# Patient Record
Sex: FEMALE | Race: OTHER | HISPANIC OR LATINO | Employment: FULL TIME | URBAN - METROPOLITAN AREA
[De-identification: names, ages, dates, MRNs, and addresses within clinical notes are randomized per-mention and may not be internally consistent; named-entity substitution may affect disease eponyms.]

---

## 2019-08-08 ENCOUNTER — OFFICE VISIT (OUTPATIENT)
Dept: FAMILY MEDICINE CLINIC | Facility: CLINIC | Age: 39
End: 2019-08-08
Payer: COMMERCIAL

## 2019-08-08 VITALS
SYSTOLIC BLOOD PRESSURE: 120 MMHG | BODY MASS INDEX: 26.21 KG/M2 | TEMPERATURE: 96.1 F | OXYGEN SATURATION: 99 % | DIASTOLIC BLOOD PRESSURE: 80 MMHG | HEART RATE: 66 BPM | WEIGHT: 130 LBS | HEIGHT: 59 IN

## 2019-08-08 DIAGNOSIS — Z13.1 SCREENING FOR DIABETES MELLITUS: Primary | ICD-10-CM

## 2019-08-08 DIAGNOSIS — Z13.220 NEED FOR LIPID SCREENING: ICD-10-CM

## 2019-08-08 DIAGNOSIS — Z13.29 SCREENING FOR THYROID DISORDER: ICD-10-CM

## 2019-08-08 DIAGNOSIS — Z86.19 HISTORY OF SYPHILIS: ICD-10-CM

## 2019-08-08 DIAGNOSIS — Z76.89 ENCOUNTER TO ESTABLISH CARE WITH NEW DOCTOR: ICD-10-CM

## 2019-08-08 DIAGNOSIS — T78.3XXD ANGIOEDEMA, SUBSEQUENT ENCOUNTER: ICD-10-CM

## 2019-08-08 DIAGNOSIS — Z13.21 ENCOUNTER FOR VITAMIN DEFICIENCY SCREENING: ICD-10-CM

## 2019-08-08 DIAGNOSIS — I10 ESSENTIAL HYPERTENSION: ICD-10-CM

## 2019-08-08 PROBLEM — N63.0 BREAST MASS: Status: ACTIVE | Noted: 2019-06-27

## 2019-08-08 PROCEDURE — 3008F BODY MASS INDEX DOCD: CPT | Performed by: INTERNAL MEDICINE

## 2019-08-08 PROCEDURE — 99204 OFFICE O/P NEW MOD 45 MIN: CPT | Performed by: INTERNAL MEDICINE

## 2019-08-08 PROCEDURE — 3074F SYST BP LT 130 MM HG: CPT | Performed by: INTERNAL MEDICINE

## 2019-08-08 RX ORDER — DIPHENOXYLATE HYDROCHLORIDE AND ATROPINE SULFATE 2.5; .025 MG/1; MG/1
1 TABLET ORAL DAILY
COMMUNITY
Start: 2017-09-21

## 2019-08-08 NOTE — PROGRESS NOTES
Assessment/Plan:         Diagnoses and all orders for this visit:    Screening for diabetes mellitus  -     CBC and differential  -     Comprehensive metabolic panel    Encounter to establish care with new doctor   available records reviewed  Essential hypertension  -     Comprehensive metabolic panel  -     Lipid panel  Blood pressure is stable  I would not start any medications time  Angioedema, subsequent encounter  Improved clinically but with the patient that patient is showing me on her cell phone  Encouraged patient to start prednisone and Zyrtec  Lab studies ordered I will be seeing her back in week or so  I would not start another medication this time since she has had persistent edema of her labs and her blood pressure is excellent  Screening for thyroid disorder  -     TSH, 3rd generation with Free T4 reflex    Need for lipid screening  -     Urinalysis with microscopic    History of syphilis  -     HIV 1/2 AG-AB combo; Future  History of treatment per patient  Encounter for vitamin deficiency screening  -     Vitamin D 25 hydroxy    Other orders  -     multivitamin (THERAGRAN) TABS; Take 1 tablet by mouth daily  -     Cholecalciferol (VITAMIN D PO); Take by mouth        Subjective:      Patient ID: Dionne Coyle is a 35 y o  female  HPI  Patient is here to establish care  She is originally from [facility] and does not understand English  She is accompanied today by her niece who is translating for her  She has been a long-term patient of [facility] she was in the ER with angioedema  Patient was advised to stop her lisinopril and start prednisone Pepcid and Zyrtec  Patient did not take his lisinopril but has not started prednisone yet  She still has some swelling of the lips but no trouble with swallowing  She still has a rash in the face but that is getting better as well  She says this has happened 3 other times which I find very surprising  She has been on a beta-blocker in the past without any adverse reactions but was switched over to lisinopril  Patient has history of syphilis diagnosed when she was pregnant with her a child  It appears patient was sent to ID and was treated  Patient was also tested for HIV which was negative at that time  Remote history of smoking  Denies any illicit drug use  The following portions of the patient's history were reviewed and updated as appropriate: allergies, current medications, past family history, past medical history, past social history, past surgical history and problem list     Review of Systems   Constitutional: Negative for appetite change, chills, fatigue, fever and unexpected weight change  HENT: Negative for congestion, hearing loss, postnasal drip, trouble swallowing and voice change  Eyes: Negative for pain and visual disturbance  Respiratory: Negative for cough, chest tightness and shortness of breath  Cardiovascular: Negative for chest pain, palpitations and leg swelling  Gastrointestinal: Negative for abdominal pain, blood in stool, constipation, diarrhea, nausea and vomiting  Endocrine: Negative for cold intolerance, heat intolerance, polydipsia and polyphagia  Genitourinary: Negative for difficulty urinating, flank pain, frequency and hematuria  Musculoskeletal: Negative for arthralgias, back pain, gait problem, joint swelling and myalgias  Skin: Positive for rash  Angioedema as above   Neurological: Negative for dizziness, weakness, light-headedness, numbness and headaches  Hematological: Negative for adenopathy  Does not bruise/bleed easily  Psychiatric/Behavioral: Negative for confusion, dysphoric mood and sleep disturbance           Objective:      /80 (BP Location: Left arm, Patient Position: Sitting, Cuff Size: Standard)   Pulse 66   Temp (!) 96 1 °F (35 6 °C) (Tympanic)   Ht 4' 11" (1 499 m)   Wt 59 kg (130 lb)   SpO2 99%   BMI 26 26 kg/m² Physical Exam   Constitutional: She is oriented to person, place, and time  No distress  HENT:   Mouth/Throat: No oropharyngeal exudate  Persistent mild swelling of the lips but oropharynx is normal    Eyes: Pupils are equal, round, and reactive to light  No scleral icterus  Neck: No thyromegaly present  Cardiovascular: Normal rate, regular rhythm, normal heart sounds and intact distal pulses  No murmur heard  Pulmonary/Chest: Effort normal and breath sounds normal  No stridor  No respiratory distress  She has no wheezes  She has no rales  Abdominal: Soft  Bowel sounds are normal  There is no tenderness  There is no rebound  Musculoskeletal: She exhibits no edema  Lymphadenopathy:     She has no cervical adenopathy  Neurological: She is alert and oriented to person, place, and time  She has normal reflexes  No cranial nerve deficit  Skin: Skin is warm  Psychiatric: She has a normal mood and affect   Her behavior is normal  Judgment and thought content normal

## 2020-02-04 ENCOUNTER — APPOINTMENT (EMERGENCY)
Dept: CT IMAGING | Facility: HOSPITAL | Age: 40
End: 2020-02-04
Payer: COMMERCIAL

## 2020-02-04 ENCOUNTER — HOSPITAL ENCOUNTER (EMERGENCY)
Facility: HOSPITAL | Age: 40
Discharge: HOME | End: 2020-02-04
Attending: EMERGENCY MEDICINE | Admitting: EMERGENCY MEDICINE
Payer: COMMERCIAL

## 2020-02-04 VITALS
SYSTOLIC BLOOD PRESSURE: 182 MMHG | DIASTOLIC BLOOD PRESSURE: 94 MMHG | TEMPERATURE: 98.1 F | HEART RATE: 96 BPM | WEIGHT: 132.28 LBS | BODY MASS INDEX: 26.72 KG/M2 | OXYGEN SATURATION: 99 % | RESPIRATION RATE: 18 BRPM

## 2020-02-04 DIAGNOSIS — R10.9 ABDOMINAL PAIN: ICD-10-CM

## 2020-02-04 DIAGNOSIS — R51.9 HEADACHE: ICD-10-CM

## 2020-02-04 DIAGNOSIS — R07.9 CHEST PAIN: Primary | ICD-10-CM

## 2020-02-04 DIAGNOSIS — I10 HYPERTENSION: ICD-10-CM

## 2020-02-04 DIAGNOSIS — M54.9 BACK PAIN: ICD-10-CM

## 2020-02-04 DIAGNOSIS — K21.00 REFLUX ESOPHAGITIS: ICD-10-CM

## 2020-02-04 DIAGNOSIS — M54.50 LUMBAR PAIN: ICD-10-CM

## 2020-02-04 LAB
ALBUMIN SERPL BCP-MCNC: 4.2 G/DL (ref 3.5–5)
ALP SERPL-CCNC: 60 U/L (ref 46–116)
ALT SERPL W P-5'-P-CCNC: 22 U/L (ref 12–78)
ANION GAP SERPL CALCULATED.3IONS-SCNC: 9 MMOL/L (ref 4–13)
AST SERPL W P-5'-P-CCNC: 22 U/L (ref 5–45)
BASOPHILS # BLD AUTO: 0.07 THOUSANDS/ΜL (ref 0–0.1)
BASOPHILS NFR BLD AUTO: 1 % (ref 0–1)
BILIRUB SERPL-MCNC: 0.6 MG/DL (ref 0.2–1)
BUN SERPL-MCNC: 11 MG/DL (ref 5–25)
CALCIUM SERPL-MCNC: 8.7 MG/DL (ref 8.3–10.1)
CHLORIDE SERPL-SCNC: 103 MMOL/L (ref 100–108)
CO2 SERPL-SCNC: 28 MMOL/L (ref 21–32)
CREAT SERPL-MCNC: 0.48 MG/DL (ref 0.6–1.3)
EOSINOPHIL # BLD AUTO: 0.05 THOUSAND/ΜL (ref 0–0.61)
EOSINOPHIL NFR BLD AUTO: 1 % (ref 0–6)
ERYTHROCYTE [DISTWIDTH] IN BLOOD BY AUTOMATED COUNT: 13.1 % (ref 11.6–15.1)
EXT PREG TEST URINE: NEGATIVE
EXT. CONTROL ED NAV: NORMAL
GFR SERPL CREATININE-BSD FRML MDRD: 123 ML/MIN/1.73SQ M
GLUCOSE SERPL-MCNC: 84 MG/DL (ref 65–140)
HCT VFR BLD AUTO: 46.8 % (ref 34.8–46.1)
HGB BLD-MCNC: 15.3 G/DL (ref 11.5–15.4)
IMM GRANULOCYTES # BLD AUTO: 0.01 THOUSAND/UL (ref 0–0.2)
IMM GRANULOCYTES NFR BLD AUTO: 0 % (ref 0–2)
LIPASE SERPL-CCNC: 139 U/L (ref 73–393)
LYMPHOCYTES # BLD AUTO: 1.99 THOUSANDS/ΜL (ref 0.6–4.47)
LYMPHOCYTES NFR BLD AUTO: 33 % (ref 14–44)
MCH RBC QN AUTO: 28.8 PG (ref 26.8–34.3)
MCHC RBC AUTO-ENTMCNC: 32.7 G/DL (ref 31.4–37.4)
MCV RBC AUTO: 88 FL (ref 82–98)
MONOCYTES # BLD AUTO: 0.55 THOUSAND/ΜL (ref 0.17–1.22)
MONOCYTES NFR BLD AUTO: 9 % (ref 4–12)
NEUTROPHILS # BLD AUTO: 3.36 THOUSANDS/ΜL (ref 1.85–7.62)
NEUTS SEG NFR BLD AUTO: 56 % (ref 43–75)
NRBC BLD AUTO-RTO: 0 /100 WBCS
PLATELET # BLD AUTO: 313 THOUSANDS/UL (ref 149–390)
PMV BLD AUTO: 10.5 FL (ref 8.9–12.7)
POTASSIUM SERPL-SCNC: 3.4 MMOL/L (ref 3.5–5.3)
PROT SERPL-MCNC: 7.9 G/DL (ref 6.4–8.2)
RBC # BLD AUTO: 5.31 MILLION/UL (ref 3.81–5.12)
SODIUM SERPL-SCNC: 140 MMOL/L (ref 136–145)
TROPONIN I SERPL-MCNC: <0.02 NG/ML
WBC # BLD AUTO: 6.03 THOUSAND/UL (ref 4.31–10.16)

## 2020-02-04 PROCEDURE — 96361 HYDRATE IV INFUSION ADD-ON: CPT

## 2020-02-04 PROCEDURE — 81025 URINE PREGNANCY TEST: CPT | Performed by: EMERGENCY MEDICINE

## 2020-02-04 PROCEDURE — 84484 ASSAY OF TROPONIN QUANT: CPT | Performed by: EMERGENCY MEDICINE

## 2020-02-04 PROCEDURE — 96374 THER/PROPH/DIAG INJ IV PUSH: CPT

## 2020-02-04 PROCEDURE — 72125 CT NECK SPINE W/O DYE: CPT

## 2020-02-04 PROCEDURE — 74177 CT ABD & PELVIS W/CONTRAST: CPT

## 2020-02-04 PROCEDURE — 36415 COLL VENOUS BLD VENIPUNCTURE: CPT | Performed by: EMERGENCY MEDICINE

## 2020-02-04 PROCEDURE — 99284 EMERGENCY DEPT VISIT MOD MDM: CPT | Performed by: EMERGENCY MEDICINE

## 2020-02-04 PROCEDURE — 83690 ASSAY OF LIPASE: CPT | Performed by: EMERGENCY MEDICINE

## 2020-02-04 PROCEDURE — 70450 CT HEAD/BRAIN W/O DYE: CPT

## 2020-02-04 PROCEDURE — 80053 COMPREHEN METABOLIC PANEL: CPT | Performed by: EMERGENCY MEDICINE

## 2020-02-04 PROCEDURE — 85025 COMPLETE CBC W/AUTO DIFF WBC: CPT | Performed by: EMERGENCY MEDICINE

## 2020-02-04 PROCEDURE — 71260 CT THORAX DX C+: CPT

## 2020-02-04 PROCEDURE — 99285 EMERGENCY DEPT VISIT HI MDM: CPT

## 2020-02-04 RX ORDER — MORPHINE SULFATE 4 MG/ML
4 INJECTION, SOLUTION INTRAMUSCULAR; INTRAVENOUS ONCE
Status: COMPLETED | OUTPATIENT
Start: 2020-02-04 | End: 2020-02-04

## 2020-02-04 RX ORDER — ACETAMINOPHEN 325 MG/1
975 TABLET ORAL ONCE
Status: COMPLETED | OUTPATIENT
Start: 2020-02-04 | End: 2020-02-04

## 2020-02-04 RX ORDER — MAGNESIUM HYDROXIDE/ALUMINUM HYDROXICE/SIMETHICONE 120; 1200; 1200 MG/30ML; MG/30ML; MG/30ML
30 SUSPENSION ORAL ONCE
Status: COMPLETED | OUTPATIENT
Start: 2020-02-04 | End: 2020-02-04

## 2020-02-04 RX ORDER — METHOCARBAMOL 500 MG/1
500 TABLET, FILM COATED ORAL 2 TIMES DAILY
Qty: 20 TABLET | Refills: 0 | Status: SHIPPED | OUTPATIENT
Start: 2020-02-04

## 2020-02-04 RX ORDER — LIDOCAINE 50 MG/G
1 PATCH TOPICAL DAILY
Qty: 15 PATCH | Refills: 0 | Status: SHIPPED | OUTPATIENT
Start: 2020-02-04

## 2020-02-04 RX ORDER — HYDROCHLOROTHIAZIDE 25 MG/1
25 TABLET ORAL DAILY
COMMUNITY
Start: 2019-08-30 | End: 2020-08-29

## 2020-02-04 RX ORDER — METOPROLOL SUCCINATE 50 MG/1
50 TABLET, EXTENDED RELEASE ORAL DAILY
COMMUNITY

## 2020-02-04 RX ORDER — AMLODIPINE BESYLATE 2.5 MG/1
2.5 TABLET ORAL DAILY
COMMUNITY

## 2020-02-04 RX ADMIN — SODIUM CHLORIDE 1000 ML: 0.9 INJECTION, SOLUTION INTRAVENOUS at 19:04

## 2020-02-04 RX ADMIN — ALUMINUM HYDROXIDE, MAGNESIUM HYDROXIDE, AND SIMETHICONE 30 ML: 200; 200; 20 SUSPENSION ORAL at 22:08

## 2020-02-04 RX ADMIN — ACETAMINOPHEN 975 MG: 325 TABLET ORAL at 19:01

## 2020-02-04 RX ADMIN — IOHEXOL 100 ML: 350 INJECTION, SOLUTION INTRAVENOUS at 21:32

## 2020-02-04 RX ADMIN — MORPHINE SULFATE 4 MG: 4 INJECTION INTRAVENOUS at 19:04

## 2020-02-05 NOTE — DISCHARGE INSTRUCTIONS
Follow-up with primary care regarding your hypertension and her traumatic injuries, fill prescriptions for pain medications and lidocaine patches    Return to the emergency department any worsening symptoms

## 2020-02-05 NOTE — ED ATTENDING ATTESTATION
2/4/2020  IChio, DO, saw and evaluated the patient  I have discussed the patient with the resident/non-physician practitioner and agree with the resident's/non-physician practitioner's findings, Plan of Care, and MDM as documented in the resident's/non-physician practitioner's note, except where noted  All available labs and Radiology studies were reviewed  I was present for key portions of any procedure(s) performed by the resident/non-physician practitioner and I was immediately available to provide assistance  At this point I agree with the current assessment done in the Emergency Department  I have conducted an independent evaluation of this patient a history and physical is as follows:  40y F w/ multiple complaints  2 wks ago pinned between pallet and forklift - seen by 'company doctor' and told okay  Having r sided cp w/ radiation to the left  Denies sob/france, notes pain w/ deep inspiration  C/o frontal ha along w/ diffuse back pain  Denies numbness or weakness  Ambulating, eating/drinking normally since incident  Hasn't taken any meds for pain  Exam - wnwd F, anxious, nad, heent: ncat, nose normal, oral mmm, perrl, neck supple, lungs cta, hrrr, abd soft mild ttp, back - some ttp upper t spine/low l spine, ext no c/c/e, neuro non-focal, skin warm,dry  A/p  Back and abd pain 2wk after trauma    Will get labs, imaging, give analgesia and re-eval  ED Course         Critical Care Time  Procedures

## 2020-02-05 NOTE — ED PROVIDER NOTES
History  Chief Complaint   Patient presents with    Chest Pain     Pt  was hit by a forklift at work and reports she now has chest pressure and pressure in her head  Pt  reports this happened one week ago and the pain has gotten worse  62-year-old female Maori-speaking presenting for evaluation of multiple complaints  Patient states approximately 2 weeks ago she was pain between a Pallet and a forklift suffering multiple injuries at that time was checked out by the company doctor get the all clear to return to work the next day without any imaging or further studies  Patient states for the past week she has been having right-sided chest pain which has radiated now to substernal and left-sided chest pain  She denies any exertional component she denies any shortness of breath but endorses severe pain with deep inspiration she also complains of frontal headache pain and has a bruise at that location she also complains of thoracic and lumbar back pain as well as epigastric and right flank pain  Patient has been ambulatory since then then eating and drinking normally she denies any nausea or vomiting has not taken any anti-inflammatories due to upset stomach and elevation of blood pressure  Patient has been taking all of her antihypertensives as prescribed is significantly hypertensive on initial evaluation but is anxious at significant pain        History provided by:  Patient   used: No    Chest Pain   Pain location:  Substernal area, R chest and L chest  Pain quality: sharp    Pain radiates to:  Does not radiate  Pain radiates to the back: no    Pain severity:  Moderate  Onset quality:  Gradual  Timing:  Constant  Progression:  Worsening  Chronicity:  New  Associated symptoms: abdominal pain and back pain    Associated symptoms: no fever, no nausea, no shortness of breath and not vomiting    Abdominal Pain   Pain location:  Epigastric and L flank  Pain quality: sharp    Pain radiates to: Does not radiate  Pain severity:  Moderate  Onset quality:  Sudden  Timing:  Constant  Progression:  Unchanged  Chronicity:  New  Relieved by:  Nothing  Worsened by:  Palpation  Ineffective treatments:  None tried  Associated symptoms: chest pain    Associated symptoms: no chills, no constipation, no diarrhea, no dysuria, no fever, no hematuria, no nausea, no shortness of breath, no sore throat and no vomiting        Prior to Admission Medications   Prescriptions Last Dose Informant Patient Reported? Taking? Cholecalciferol (VITAMIN D PO)  Self Yes Yes   Sig: Take by mouth   amLODIPine (NORVASC) 2 5 mg tablet   Yes Yes   Sig: Take 2 5 mg by mouth daily   hydrochlorothiazide (HYDRODIURIL) 25 mg tablet   Yes Yes   Sig: Take 25 mg by mouth daily   metoprolol succinate (TOPROL-XL) 50 mg 24 hr tablet   Yes Yes   Sig: Take 50 mg by mouth daily   multivitamin (THERAGRAN) TABS  Self Yes Yes   Sig: Take 1 tablet by mouth daily      Facility-Administered Medications: None       Past Medical History:   Diagnosis Date    Allergic     Anemia     HL (hearing loss)     Hypertension        History reviewed  No pertinent surgical history  Family History   Problem Relation Age of Onset    No Known Problems Mother      I have reviewed and agree with the history as documented  Social History     Tobacco Use    Smoking status: Never Smoker    Smokeless tobacco: Never Used   Substance Use Topics    Alcohol use: Never     Frequency: Never    Drug use: Never        Review of Systems   Constitutional: Negative for chills and fever  HENT: Negative for sore throat  Eyes: Negative for visual disturbance  Respiratory: Negative for chest tightness, shortness of breath and wheezing  Cardiovascular: Positive for chest pain  Gastrointestinal: Positive for abdominal pain  Negative for constipation, diarrhea, nausea and vomiting  Genitourinary: Negative for dysuria and hematuria     Musculoskeletal: Positive for back pain and neck pain  Negative for arthralgias and myalgias  Skin: Negative for color change  Neurological: Negative for light-headedness  Hematological: Negative for adenopathy  Psychiatric/Behavioral: Negative for agitation and behavioral problems  All other systems reviewed and are negative  Physical Exam  ED Triage Vitals [02/04/20 1758]   Temperature Pulse Respirations Blood Pressure SpO2   98 1 °F (36 7 °C) 91 20 (!) 217/131 100 %      Temp Source Heart Rate Source Patient Position - Orthostatic VS BP Location FiO2 (%)   Oral Monitor Sitting Right arm --      Pain Score       Worst Possible Pain             Orthostatic Vital Signs  Vitals:    02/04/20 1908 02/04/20 1950 02/04/20 2148 02/04/20 2210   BP: (!) 174/90 (!) 177/99 (!) 207/102 (!) 182/94   Pulse: 79 81 89 96   Patient Position - Orthostatic VS: Lying          Physical Exam   Constitutional: She is oriented to person, place, and time  She appears well-developed and well-nourished  No distress  HENT:   Head: Normocephalic and atraumatic  Eyes: Conjunctivae and EOM are normal  No scleral icterus  Neck: Normal range of motion  Neck supple  Cardiovascular: Normal rate, regular rhythm and normal heart sounds  No murmur heard  Pulmonary/Chest: Effort normal and breath sounds normal  No respiratory distress  She has no decreased breath sounds  Abdominal: Soft  Bowel sounds are normal  There is tenderness  Musculoskeletal: Normal range of motion  Arms:  Neurological: She is alert and oriented to person, place, and time  Skin: Skin is warm and dry  Psychiatric: She has a normal mood and affect  Her behavior is normal    Nursing note and vitals reviewed        ED Medications  Medications   sodium chloride 0 9 % bolus 1,000 mL (0 mL Intravenous Stopped 2/4/20 2113)   acetaminophen (TYLENOL) tablet 975 mg (975 mg Oral Given 2/4/20 1901)   morphine (PF) 4 mg/mL injection 4 mg (4 mg Intravenous Given 2/4/20 1904)   iohexol (OMNIPAQUE) 350 MG/ML injection (MULTI-DOSE) 100 mL (100 mL Intravenous Given 2/4/20 2132)   aluminum-magnesium hydroxide-simethicone (MYLANTA) 200-200-20 mg/5 mL oral suspension 30 mL (30 mL Oral Given 2/4/20 2208)       Diagnostic Studies  Results Reviewed     Procedure Component Value Units Date/Time    Troponin I [[id number]]  (Normal) Collected:  02/04/20 1852    Lab Status:  Final result Specimen:  Blood from Arm, Left Updated:  02/04/20 2000     Troponin I <0 02 ng/mL     Comprehensive metabolic panel [[id number]]  (Abnormal) Collected:  02/04/20 1852    Lab Status:  Final result Specimen:  Blood from Arm, Left Updated:  02/04/20 1959     Sodium 140 mmol/L      Potassium 3 4 mmol/L      Chloride 103 mmol/L      CO2 28 mmol/L      ANION GAP 9 mmol/L      BUN 11 mg/dL      Creatinine 0 48 mg/dL      Glucose 84 mg/dL      Calcium 8 7 mg/dL      AST 22 U/L      ALT 22 U/L      Alkaline Phosphatase 60 U/L      Total Protein 7 9 g/dL      Albumin 4 2 g/dL      Total Bilirubin 0 60 mg/dL      eGFR 123 ml/min/1 73sq m     Narrative:       [place] guidelines for Chronic Kidney Disease (CKD):     Stage 1 with normal or high GFR (GFR > 90 mL/min/1 73 square meters)    Stage 2 Mild CKD (GFR = 60-89 mL/min/1 73 square meters)    Stage 3A Moderate CKD (GFR = 45-59 mL/min/1 73 square meters)    Stage 3B Moderate CKD (GFR = 30-44 mL/min/1 73 square meters)    Stage 4 Severe CKD (GFR = 15-29 mL/min/1 73 square meters)    Stage 5 End Stage CKD (GFR <15 mL/min/1 73 square meters)  Note: GFR calculation is accurate only with a steady state creatinine    Lipase [[id number]]  (Normal) Collected:  02/04/20 1852    Lab Status:  Final result Specimen:  Blood from Arm, Left Updated:  02/04/20 1959     Lipase 139 u/L     CBC and differential [[id number]]  (Abnormal) Collected:  02/04/20 1852    Lab Status:  Final result Specimen:  Blood from Arm, Left Updated:  02/04/20 1942     WBC 6 03 Thousand/uL RBC 5 31 Million/uL      Hemoglobin 15 3 g/dL      Hematocrit 46 8 %      MCV 88 fL      MCH 28 8 pg      MCHC 32 7 g/dL      RDW 13 1 %      MPV 10 5 fL      Platelets 308 Thousands/uL      nRBC 0 /100 WBCs      Neutrophils Relative 56 %      Immat GRANS % 0 %      Lymphocytes Relative 33 %      Monocytes Relative 9 %      Eosinophils Relative 1 %      Basophils Relative 1 %      Neutrophils Absolute 3 36 Thousands/µL      Immature Grans Absolute 0 01 Thousand/uL      Lymphocytes Absolute 1 99 Thousands/µL      Monocytes Absolute 0 55 Thousand/µL      Eosinophils Absolute 0 05 Thousand/µL      Basophils Absolute 0 07 Thousands/µL     POCT pregnancy, urine [[id number]]  (Normal) Resulted:  02/04/20 1903    Lab Status:  Final result Updated:  02/04/20 1904     EXT PREG TEST UR (Ref: Negative) negative     Control valid                 CT head without contrast   Final Result by Dorian Mohan DO (02/04 2148)      No acute intracranial abnormality  Workstation performed: [id number]         CT cervical spine without contrast   Final Result by Dorian Mohan DO (02/04 2149)      No cervical spine fracture or traumatic malalignment  Workstation performed: [id number]         CT chest abdomen pelvis w contrast   Final Result by Rishi Mcgill MD (02/04 2205)      Unremarkable CT of the chest, abdomen and pelvis              Workstation performed: [id number]               Procedures  Procedures      ED Course  ED Course as of Feb 05 1609   Tue Feb 04, 2020 1912 PREGNANCY TEST URINE: negative                               MDM  Number of Diagnoses or Management Options  Abdominal pain: new and requires workup  Back pain: new and requires workup  Chest pain: new and requires workup  Headache: new and requires workup  Hypertension: new and requires workup  Lumbar pain: new and requires workup  Reflux esophagitis: new and requires workup  Diagnosis management comments: 70-year-old female presenting after trauma 2 weeks ago, will obtain CT head neck chest abdomen pelvis due to patient's significant complaints throughout her body  Also provide analgesia and check laboratories due to patient's abdominal and chest pains  Patient's laboratories unremarkable, CT scans were unremarkable, discussed with patient following up regarding her hypertension and continued pain with primary care and gave close return precautions for any change in symptomatology  Patient's pain at discharge had resolved and felt much better  Amount and/or Complexity of Data Reviewed  Clinical lab tests: ordered and reviewed  Tests in the radiology section of CPT®: ordered and reviewed  Tests in the medicine section of CPT®: ordered and reviewed  Review and summarize past medical records: yes  Independent visualization of images, tracings, or specimens: yes          Disposition  Final diagnoses:   Back pain   Chest pain   Abdominal pain   Lumbar pain   Headache   Reflux esophagitis   Hypertension     Time reflects when diagnosis was documented in both MDM as applicable and the Disposition within this note     Time User Action Codes Description Comment    2/4/2020 10:08 PM Tilmon Jeb Add [M54 9] Back pain     2/4/2020 10:08 PM Tilmon Jeb Add [R07 9] Chest pain     2/4/2020 10:08 PM Tilmon Jeb Add [R10 9] Abdominal pain     2/4/2020 10:08 PM Tilmon Jeb Add [M54 5] Lumbar pain     2/4/2020 10:08 PM Tilmon Jeb Modify [M54 9] Back pain     2/4/2020 10:08 PM Tilmon Jeb Modify [R07 9] Chest pain     2/4/2020 10:08 PM Tilmon Jeb Add [R51] Headache     2/4/2020 10:09 PM Tilmon Jeb Add [K21 0] Reflux esophagitis     2/4/2020 10:10 PM Tilmon Jeb Add [I10] Hypertension       ED Disposition     ED Disposition Condition Date/Time Comment    Discharge Stable Tue Feb 4, 2020 10:07 PM Porter Sickle Kay discharge to home/self care              Follow-up Information Follow up With Specialties Details Why Contact Info Additional [address] [facility] MD Tha Internal Medicine Schedule an appointment as soon as possible for a visit   [address]  [address] [facility]  [address] [facility]       [address] [place] [address] Emergency Department Emergency Medicine  If symptoms worsen Theresa [id number]  [facility] ED, [address]  , [place], [place], [address]          Discharge Medication List as of 2/4/2020 10:10 PM      START taking these medications    Details   lidocaine (LIDODERM) 5 % Apply 1 patch topically daily Remove & Discard patch within 12 hours or as directed by MD, Starting Tue 2/4/2020, Print      methocarbamol (ROBAXIN) 500 mg tablet Take 1 tablet (500 mg total) by mouth 2 (two) times a day, Starting Tue 2/4/2020, Print         CONTINUE these medications which have NOT CHANGED    Details   amLODIPine (NORVASC) 2 5 mg tablet Take 2 5 mg by mouth daily, Historical Med      Cholecalciferol (VITAMIN D PO) Take by mouth, Historical Med      hydrochlorothiazide (HYDRODIURIL) 25 mg tablet Take 25 mg by mouth daily, Starting Fri 8/30/2019, Until Sat 8/29/2020, Historical Med      metoprolol succinate (TOPROL-XL) 50 mg 24 hr tablet Take 50 mg by mouth daily, Historical Med      multivitamin (THERAGRAN) TABS Take 1 tablet by mouth daily, Starting Thu 9/21/2017, Historical Med           No discharge procedures on file  ED Provider  Attending physically available and evaluated Isabelle Villanueva I managed the patient along with the ED Attending      Electronically Signed by         Laurie Palmer MD  02/05/20 7321

## 2020-02-20 NOTE — PROGRESS NOTES
Cardiology Consultation     Doris Melo  [id number]  1980  [facility] [place]  [address] [facility] [address]  1  Chest pain, unspecified type  Echo complete with contrast if indicated    Stress test only, exercise   2  Essential (primary) hypertension  Echo complete with contrast if indicated    Stress test only, exercise     Patient Active Problem List   Diagnosis    Breast mass    History of syphilis       HPI patient is here for cardiac evaluation  Patient is originally from the [facility]  She had presented to the emergency room February 4, 2020 with right-sided chest discomfort  Patient had had recent trauma to the chest and it was felt the discomfort may be related  She had a workup and eventually was discharged  Troponin was negative  EKG interpreted as sinus tachycardia with incomplete right bundle branch block and biatrial cavity enlargement  There were no ischemic ST segment changes  Patient had a CT scan of the chest and the heart and great vessels were felt to be unremarkable for patient's age  CT scan of the chest abdomen and pelvis in general was unremarkable  CMP demonstrated potassium of 3 4 and creatinine of 0 48  She has a prior history of angioedema with lisinopril  In reference to essential hypertension she is on amlodipine, metoprolol succinate and hydrochlorothiazide  She has remote history of tobacco use  Patient was told to stay out of work until today and to get clearance from me to return to work based on her blood pressure determination  Her blood pressure is reasonable and I do give her permission to return to work      PMH-  Past Medical History:   Diagnosis Date    Allergic     Anemia     HL (hearing loss)     Hypertension         SOCIAL HISTORY-  Social History     Socioeconomic History    Marital status: Single     Spouse name: Not on file    Number of children: Not on file    Years of education: Not on file    Highest education level: Not on file   Occupational History    Not on file   Social Needs    Financial resource strain: Not on file    Food insecurity:     Worry: Not on file     Inability: Not on file    Transportation needs:     Medical: Not on file     Non-medical: Not on file   Tobacco Use    Smoking status: Never Smoker    Smokeless tobacco: Never Used   Substance and Sexual Activity    Alcohol use: Never     Frequency: Never    Drug use: Never    Sexual activity: Yes     Partners: Male   Lifestyle    Physical activity:     Days per week: Not on file     Minutes per session: Not on file    Stress: Not on file   Relationships    Social connections:     Talks on phone: Not on file     Gets together: Not on file     Attends Orthodoxy service: Not on file     Active member of club or organization: Not on file     Attends meetings of clubs or organizations: Not on file     Relationship status: Not on file    Intimate partner violence:     Fear of current or ex partner: Not on file     Emotionally abused: Not on file     Physically abused: Not on file     Forced sexual activity: Not on file   Other Topics Concern    Not on file   Social History Narrative    Not on file        FAMILY HISTORY-  Family History   Problem Relation Age of Onset    No Known Problems Mother        SURGICAL HISTORY-  No past surgical history on file        Current Outpatient Medications:     amLODIPine (NORVASC) 10 mg tablet, , Disp: , Rfl:     hydrochlorothiazide (HYDRODIURIL) 25 mg tablet, Take 25 mg by mouth daily, Disp: , Rfl:     methocarbamol (ROBAXIN) 500 mg tablet, Take 1 tablet (500 mg total) by mouth 2 (two) times a day, Disp: 20 tablet, Rfl: 0    metoprolol succinate (TOPROL-XL) 50 mg 24 hr tablet, , Disp: , Rfl:     amLODIPine (NORVASC) 2 5 mg tablet, Take 2 5 mg by mouth daily, Disp: , Rfl:     Cholecalciferol (VITAMIN D PO), Take by mouth, Disp: , Rfl:     lidocaine (LIDODERM) 5 %, Apply 1 patch topically daily Remove & Discard patch within 12 hours or as directed by MD (Patient not taking: Reported on 2/25/2020), Disp: 15 patch, Rfl: 0    methocarbamol (ROBAXIN) 500 mg tablet, , Disp: , Rfl:     metoprolol succinate (TOPROL-XL) 50 mg 24 hr tablet, Take 50 mg by mouth daily, Disp: , Rfl:     multivitamin (THERAGRAN) TABS, Take 1 tablet by mouth daily, Disp: , Rfl:   Allergies   Allergen Reactions    Lisinopril Swelling and Angioedema    Nortriptyline Hives     RASH    Diphenhydramine Itching     Vitals:    02/25/20 1500   BP: 132/88   BP Location: Left arm   Patient Position: Sitting   Cuff Size: Standard   Pulse: 80   Weight: 58 7 kg (129 lb 8 oz)   Height: 4' 11" (1 499 m)         Review of Systems:  Review of Systems   All other systems reviewed and are negative  Physical Exam:  Physical Exam   Constitutional: She is oriented to person, place, and time  She appears well-developed and well-nourished  HENT:   Head: Normocephalic and atraumatic  Eyes: Pupils are equal, round, and reactive to light  Conjunctivae and EOM are normal    Neck: Normal range of motion  Neck supple  Cardiovascular: Normal rate and normal heart sounds  Pulmonary/Chest: Effort normal and breath sounds normal    Neurological: She is alert and oriented to person, place, and time  Skin: Skin is warm and dry  Psychiatric: She has a normal mood and affect  Vitals reviewed  Discussion/Summary:  Will schedule cardiac testing  Will schedule a treadmill test and an echocardiogram   I will continue her present medical regimen in reference to blood pressure management  As necessary we certainly could titrate the dose of the amlodipine if necessary but her blood pressure was acceptable today  In reference to returning to work I think she is okay to do this  Her blood pressure is reasonable today and she is having no cardiac symptoms    I did give her a return to work letter

## 2020-02-25 ENCOUNTER — OFFICE VISIT (OUTPATIENT)
Dept: CARDIOLOGY CLINIC | Facility: CLINIC | Age: 40
End: 2020-02-25
Payer: OTHER MISCELLANEOUS

## 2020-02-25 VITALS
WEIGHT: 129.5 LBS | BODY MASS INDEX: 26.11 KG/M2 | HEIGHT: 59 IN | DIASTOLIC BLOOD PRESSURE: 88 MMHG | HEART RATE: 80 BPM | SYSTOLIC BLOOD PRESSURE: 132 MMHG

## 2020-02-25 DIAGNOSIS — I10 ESSENTIAL (PRIMARY) HYPERTENSION: ICD-10-CM

## 2020-02-25 DIAGNOSIS — R07.9 CHEST PAIN, UNSPECIFIED TYPE: Primary | ICD-10-CM

## 2020-02-25 PROCEDURE — 99243 OFF/OP CNSLTJ NEW/EST LOW 30: CPT | Performed by: INTERNAL MEDICINE

## 2020-02-25 RX ORDER — METHOCARBAMOL 500 MG/1
TABLET, FILM COATED ORAL
COMMUNITY
Start: 2020-02-04

## 2020-02-25 RX ORDER — METOPROLOL SUCCINATE 50 MG/1
TABLET, EXTENDED RELEASE ORAL
COMMUNITY
Start: 2020-01-30

## 2020-02-25 RX ORDER — AMLODIPINE BESYLATE 10 MG/1
TABLET ORAL
COMMUNITY
Start: 2020-02-18

## 2020-02-25 NOTE — PATIENT INSTRUCTIONS
Will schedule cardiac testing  Please call if there is a problem in the interim  Will see you as necessary based on results of tests

## 2020-02-25 NOTE — LETTER
February 25, 2020     Patient: Henok Villanueva   YOB: 1980   Date of Visit: 2/25/2020       To Whom it May Concern:    Bart Villanueva is under my professional care  She was seen in my office on 2/25/2020  She may return to work on 2/26/2020 with no restrictions  If you have any questions or concerns, please don't hesitate to call  Sincerely,          Donn Mendosa MD        CC: Ratna Villanueva

## 2020-03-05 ENCOUNTER — HOSPITAL ENCOUNTER (OUTPATIENT)
Dept: NON INVASIVE DIAGNOSTICS | Facility: HOSPITAL | Age: 40
Discharge: HOME | End: 2020-03-05
Attending: INTERNAL MEDICINE
Payer: COMMERCIAL

## 2020-03-05 DIAGNOSIS — I10 ESSENTIAL (PRIMARY) HYPERTENSION: ICD-10-CM

## 2020-03-05 DIAGNOSIS — R07.9 CHEST PAIN, UNSPECIFIED TYPE: ICD-10-CM

## 2020-03-05 PROCEDURE — 93017 CV STRESS TEST TRACING ONLY: CPT

## 2020-03-05 PROCEDURE — 93016 CV STRESS TEST SUPVJ ONLY: CPT | Performed by: INTERNAL MEDICINE

## 2020-03-05 PROCEDURE — 93018 CV STRESS TEST I&R ONLY: CPT | Performed by: INTERNAL MEDICINE

## 2020-03-06 ENCOUNTER — TELEPHONE (OUTPATIENT)
Dept: CARDIOLOGY CLINIC | Facility: CLINIC | Age: 40
End: 2020-03-06

## 2020-03-06 DIAGNOSIS — R07.9 CHEST PAIN, UNSPECIFIED TYPE: Primary | ICD-10-CM

## 2020-03-06 NOTE — TELEPHONE ENCOUNTER
Call made to pt in [address] [facility], per ming needs nuclear stress-will arrange

## 2020-03-06 NOTE — TELEPHONE ENCOUNTER
----- Message from Rosalba Carrera MD sent at 3/5/2020  6:12 PM EST -----  Please call the patient regarding her abnormal result  Stress test with EKG changes I think are false positive  Please have scheduling arrange exercise nuke   She is [place] speaking, tx

## 2020-03-09 LAB
CHEST PAIN STATEMENT: NORMAL
MAX DIASTOLIC BP: 80 MMHG
MAX HEART RATE: 155 BPM
MAX PREDICTED HEART RATE: 179 BPM
MAX. SYSTOLIC BP: 158 MMHG
PROTOCOL NAME: NORMAL
REASON FOR TERMINATION: NORMAL
TARGET HR FORMULA: NORMAL
TIME IN EXERCISE PHASE: NORMAL

## 2020-03-10 ENCOUNTER — TELEPHONE (OUTPATIENT)
Dept: CARDIOLOGY CLINIC | Facility: CLINIC | Age: 40
End: 2020-03-10

## 2020-03-10 NOTE — TELEPHONE ENCOUNTER
Patients PCP Dr Maurisio Fulton called, they are aware patient needs to have a nuc stress test  Patient is scheduled on 3/30 for this  PCP is questioning if patient can continue to work? 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

## 2020-03-10 NOTE — TELEPHONE ENCOUNTER
S/w Natalie Agent @ PCP office  Advised of recommendations  I also provided # for central scheduling for patient to call to r/s if possible  I advised him to have Dr Vernell Pickett call me if she has any questions  Faxed telephone note to her as well

## 2020-03-30 ENCOUNTER — HOSPITAL ENCOUNTER (OUTPATIENT)
Dept: NUCLEAR MEDICINE | Facility: HOSPITAL | Age: 40
Discharge: HOME | End: 2020-03-30
Attending: INTERNAL MEDICINE
Payer: COMMERCIAL

## 2020-03-30 ENCOUNTER — HOSPITAL ENCOUNTER (OUTPATIENT)
Dept: NON INVASIVE DIAGNOSTICS | Facility: HOSPITAL | Age: 40
Discharge: HOME | End: 2020-03-30
Attending: INTERNAL MEDICINE
Payer: COMMERCIAL

## 2020-03-30 DIAGNOSIS — R07.9 CHEST PAIN, UNSPECIFIED TYPE: ICD-10-CM

## 2020-03-30 LAB
ARRHY DURING EX: NORMAL
CHEST PAIN STATEMENT: NORMAL
MAX DIASTOLIC BP: 70 MMHG
MAX HEART RATE: 166 BPM
MAX PREDICTED HEART RATE: 179 BPM
MAX. SYSTOLIC BP: 156 MMHG
PROTOCOL NAME: NORMAL
TARGET HR FORMULA: NORMAL
TIME IN EXERCISE PHASE: NORMAL

## 2020-03-30 PROCEDURE — 78452 HT MUSCLE IMAGE SPECT MULT: CPT

## 2020-03-30 PROCEDURE — 93016 CV STRESS TEST SUPVJ ONLY: CPT

## 2020-03-30 PROCEDURE — A9502 TC99M TETROFOSMIN: HCPCS

## 2020-03-30 PROCEDURE — 93018 CV STRESS TEST I&R ONLY: CPT

## 2020-03-30 PROCEDURE — 93017 CV STRESS TEST TRACING ONLY: CPT

## 2020-04-13 ENCOUNTER — TELEPHONE (OUTPATIENT)
Dept: CARDIOLOGY CLINIC | Facility: CLINIC | Age: 40
End: 2020-04-13

## 2022-04-22 PROCEDURE — 99284 EMERGENCY DEPT VISIT MOD MDM: CPT

## 2022-04-23 ENCOUNTER — HOSPITAL ENCOUNTER (EMERGENCY)
Facility: HOSPITAL | Age: 42
Discharge: HOME | End: 2022-04-23
Attending: EMERGENCY MEDICINE | Admitting: EMERGENCY MEDICINE
Payer: COMMERCIAL

## 2022-04-23 ENCOUNTER — APPOINTMENT (EMERGENCY)
Dept: RADIOLOGY | Facility: HOSPITAL | Age: 42
End: 2022-04-23
Payer: COMMERCIAL

## 2022-04-23 VITALS
TEMPERATURE: 97.9 F | HEART RATE: 86 BPM | BODY MASS INDEX: 24.4 KG/M2 | SYSTOLIC BLOOD PRESSURE: 147 MMHG | DIASTOLIC BLOOD PRESSURE: 87 MMHG | RESPIRATION RATE: 18 BRPM | OXYGEN SATURATION: 99 % | WEIGHT: 120.81 LBS

## 2022-04-23 DIAGNOSIS — J20.9 ACUTE BRONCHITIS: Primary | ICD-10-CM

## 2022-04-23 LAB
ATRIAL RATE: 82 BPM
FLUAV RNA RESP QL NAA+PROBE: NEGATIVE
FLUBV RNA RESP QL NAA+PROBE: NEGATIVE
P AXIS: 83 DEGREES
PR INTERVAL: 160 MS
QRS AXIS: 83 DEGREES
QRSD INTERVAL: 84 MS
QT INTERVAL: 368 MS
QTC INTERVAL: 429 MS
SARS-COV-2 RNA RESP QL NAA+PROBE: NEGATIVE
T WAVE AXIS: 68 DEGREES
VENTRICULAR RATE: 82 BPM

## 2022-04-23 PROCEDURE — 99285 EMERGENCY DEPT VISIT HI MDM: CPT | Performed by: EMERGENCY MEDICINE

## 2022-04-23 PROCEDURE — 87636 SARSCOV2 & INF A&B AMP PRB: CPT | Performed by: EMERGENCY MEDICINE

## 2022-04-23 PROCEDURE — 71045 X-RAY EXAM CHEST 1 VIEW: CPT

## 2022-04-23 PROCEDURE — 93005 ELECTROCARDIOGRAM TRACING: CPT

## 2022-04-23 PROCEDURE — 93010 ELECTROCARDIOGRAM REPORT: CPT | Performed by: INTERNAL MEDICINE

## 2022-04-23 RX ORDER — PREDNISONE 20 MG/1
40 TABLET ORAL DAILY
Qty: 8 TABLET | Refills: 0 | Status: SHIPPED | OUTPATIENT
Start: 2022-04-23 | End: 2022-04-27

## 2022-04-23 RX ORDER — ALBUTEROL SULFATE 90 UG/1
2 AEROSOL, METERED RESPIRATORY (INHALATION) EVERY 4 HOURS PRN
Qty: 18 G | Refills: 0 | Status: SHIPPED | OUTPATIENT
Start: 2022-04-23

## 2022-04-23 RX ORDER — AZITHROMYCIN 250 MG/1
TABLET, FILM COATED ORAL
Qty: 6 TABLET | Refills: 0 | Status: SHIPPED | OUTPATIENT
Start: 2022-04-23 | End: 2022-04-27

## 2022-04-23 NOTE — ED PROVIDER NOTES
History  Chief Complaint   Patient presents with    Cough     Pt reports productive cough and upper back pain x1 week, reports chills yesterday  This is a 76-year-old female with a history of hypertension who presents with a cough  Over the past week, patient has been experiencing a productive cough  Yesterday, started with upper back pain when she coughs and chills  She has not taken anything for her symptoms  Denies fever/chills, nausea/vomiting, lightheadedness/dizziness, numbness/weakness, headache, change in vision, URI symptoms, neck pain, chest pain, palpitations, shortness of breath, cough, flank pain, abdominal pain, diarrhea, hematochezia, melena, dysuria, hematuria, abnormal vaginal discharge/bleeding  Prior to Admission Medications   Prescriptions Last Dose Informant Patient Reported? Taking? Cholecalciferol (VITAMIN D PO)  Self Yes No   Sig: Take by mouth   amLODIPine (NORVASC) 10 mg tablet   Yes No   amLODIPine (NORVASC) 2 5 mg tablet   Yes No   Sig: Take 2 5 mg by mouth daily   hydrochlorothiazide (HYDRODIURIL) 25 mg tablet   Yes No   Sig: Take 25 mg by mouth daily   lidocaine (LIDODERM) 5 %   No No   Sig: Apply 1 patch topically daily Remove & Discard patch within 12 hours or as directed by MD   Patient not taking: Reported on 2/25/2020   methocarbamol (ROBAXIN) 500 mg tablet   No No   Sig: Take 1 tablet (500 mg total) by mouth 2 (two) times a day   methocarbamol (ROBAXIN) 500 mg tablet   Yes No   metoprolol succinate (TOPROL-XL) 50 mg 24 hr tablet   Yes No   Sig: Take 50 mg by mouth daily   metoprolol succinate (TOPROL-XL) 50 mg 24 hr tablet   Yes No   multivitamin (THERAGRAN) TABS  Self Yes No   Sig: Take 1 tablet by mouth daily      Facility-Administered Medications: None       Past Medical History:   Diagnosis Date    Allergic     Anemia     HL (hearing loss)     Hypertension        History reviewed  No pertinent surgical history      Family History   Problem Relation Age of Onset    No Known Problems Mother      I have reviewed and agree with the history as documented  E-Cigarette/Vaping     E-Cigarette/Vaping Substances     Social History     Tobacco Use    Smoking status: Never Smoker    Smokeless tobacco: Never Used   Substance Use Topics    Alcohol use: Never    Drug use: Never       Review of Systems   Constitutional: Positive for chills  Negative for fever  HENT: Negative for congestion, rhinorrhea, sore throat and trouble swallowing  Respiratory: Positive for cough  Negative for chest tightness, shortness of breath and wheezing  Cardiovascular: Negative for chest pain and palpitations  Gastrointestinal: Negative for abdominal pain, blood in stool, diarrhea, nausea and vomiting  Musculoskeletal: Positive for back pain  Negative for neck pain  All other systems reviewed and are negative  Physical Exam  Physical Exam  Constitutional:       General: She is not in acute distress  Appearance: Normal appearance  She is well-developed  HENT:      Mouth/Throat:      Lips: Pink  Mouth: Mucous membranes are moist       Pharynx: Oropharynx is clear  Uvula midline  No pharyngeal swelling, oropharyngeal exudate, posterior oropharyngeal erythema or uvula swelling  Tonsils: No tonsillar exudate or tonsillar abscesses  Eyes:      Conjunctiva/sclera: Conjunctivae normal       Pupils: Pupils are equal, round, and reactive to light  Neck:      Thyroid: No thyroid mass or thyromegaly  Trachea: Trachea normal    Cardiovascular:      Rate and Rhythm: Normal rate and regular rhythm  Heart sounds: Normal heart sounds  No murmur heard  Pulmonary:      Effort: Pulmonary effort is normal       Breath sounds: Normal breath sounds  Abdominal:      General: Bowel sounds are normal       Palpations: Abdomen is soft  Tenderness: There is no abdominal tenderness  There is no guarding or rebound  Skin:     General: Skin is warm and dry  Neurological:      Mental Status: She is alert  Psychiatric:         Speech: Speech normal          Behavior: Behavior normal  Behavior is cooperative  Thought Content: Thought content normal          Vital Signs  ED Triage Vitals   Temperature Pulse Respirations Blood Pressure SpO2   04/22/22 2355 04/22/22 2355 04/22/22 2355 04/22/22 2355 04/22/22 2355   97 9 °F (36 6 °C) 88 16 142/92 98 %      Temp Source Heart Rate Source Patient Position - Orthostatic VS BP Location FiO2 (%)   04/22/22 2355 04/22/22 2355 04/22/22 2355 04/22/22 2355 --   Oral Monitor Sitting Right arm       Pain Score       04/22/22 2357       6           Vitals:    04/22/22 2355 04/23/22 0043 04/23/22 0045   BP: 142/92 147/87 147/87   Pulse: 88 84 86   Patient Position - Orthostatic VS: Sitting Lying Lying         Visual Acuity      ED Medications  Medications - No data to display    Diagnostic Studies  Results Reviewed     Procedure Component Value Units Date/Time    COVID/FLU - 24 hour TAT [[id number]] Collected: 04/23/22 0043    Lab Status: In process Specimen: Nares from Nose Updated: 04/23/22 0047                 XR chest 1 view portable   ED Interpretation by Myla Benjamin MD (04/23 0102)   No acute cardiopulmonary disease as interpreted by myself                   Procedures  ECG 12 Lead Documentation Only    Date/Time: 4/23/2022 12:40 AM  Performed by: Myla Benjamin MD  Authorized by: Myla Benjamin MD     ECG reviewed by me, the ED Provider: yes    Patient location:  ED  Previous ECG:     Previous ECG:  Compared to current    Comparison ECG info:  2/4/20    Similarity:  No change    Comparison to cardiac monitor: Yes    Interpretation:     Interpretation: non-specific    Rate:     ECG rate:  82    ECG rate assessment: normal    Rhythm:     Rhythm: sinus rhythm    Ectopy:     Ectopy: none    QRS:     QRS axis:  Normal    QRS intervals:  Normal  Conduction:     Conduction: abnormal      Abnormal conduction: incomplete RBBB    ST segments:     ST segments:  Normal  T waves:     T waves: normal               ED Course                               SBIRT 20yo+      Most Recent Value   SBIRT (22 yo +)    In order to provide better care to our patients, we are screening all of our patients for alcohol and drug use  Would it be okay to ask you these screening questions? Yes Filed at: 04/23/2022 0013   Initial Alcohol Screen: US AUDIT-C     1  How often do you have a drink containing alcohol? 0 Filed at: 04/23/2022 0013   2  How many drinks containing alcohol do you have on a typical day you are drinking? 0 Filed at: 04/23/2022 0013   3a  Male UNDER 65: How often do you have five or more drinks on one occasion? 0 Filed at: 04/23/2022 0013   3b  FEMALE Any Age, or MALE 65+: How often do you have 4 or more drinks on one occassion? 0 Filed at: 04/23/2022 0013   Audit-C Score 0 Filed at: 04/23/2022 0013   DOMONIQUE: How many times in the past year have you    Used an illegal drug or used a prescription medication for non-medical reasons? Never Filed at: 04/23/2022 0013                    MDM  Number of Diagnoses or Management Options  Diagnosis management comments: Cold/flu swab  Chest x-ray and EKG  Disposition  Final diagnoses:   Acute bronchitis     Time reflects when diagnosis was documented in both MDM as applicable and the Disposition within this note     Time User Action Codes Description Comment    4/23/2022  1:02 AM Clau MONTANO Add [J20 9] Acute bronchitis       ED Disposition     ED Disposition Condition Date/Time Comment    Discharge Stable Sat Apr 23, 2022  1:02 AM Jose Villanueva discharge to home/self care              Follow-up Information     Follow up With Specialties Details Why Contact Info Additional [address] [address] MD [facility] Schedule an appointment as soon as possible for a visit   [address]  [address] [facility]  Vipul BENJAMIN  [address]  [address] Emergency Department Emergency Medicine Go to  If symptoms worsen [facility] [address]  [address] [facility] Emergency Department, [address] [facility] [address] [address]  , [place], [place], [address]          Patient's Medications   Discharge Prescriptions    ALBUTEROL (PROAIR HFA) 90 MCG/ACT INHALER    Inhale 2 puffs every 4 (four) hours as needed for shortness of breath       Start Date: 4/23/2022 End Date: --       Order Dose: 2 puffs       Quantity: 18 g    Refills: 0    AZITHROMYCIN (ZITHROMAX) 250 MG TABLET    Take 2 tablets today then 1 tablet daily x 4 days       Start Date: 4/23/2022 End Date: 4/27/2022       Order Dose: --       Quantity: 6 tablet    Refills: 0    PREDNISONE 20 MG TABLET    Take 2 tablets (40 mg total) by mouth daily for 4 days       Start Date: 4/23/2022 End Date: 4/27/2022       Order Dose: 40 mg       Quantity: 8 tablet    Refills: 0       No discharge procedures on file      PDMP Review     None          ED Provider  Electronically Signed by           Micheal Narvaez MD  04/23/22 5299

## 2022-08-26 PROBLEM — H92.02 EAR PAIN, LEFT: Status: ACTIVE | Noted: 2021-06-07

## 2022-08-26 PROBLEM — Z13.88 SCREENING FOR LEAD EXPOSURE: Status: ACTIVE | Noted: 2022-02-14

## 2022-08-26 PROBLEM — Z86.16 HISTORY OF COVID-19: Status: ACTIVE | Noted: 2022-01-26

## 2022-08-26 PROBLEM — H83.3X3 NOISE-INDUCED HEARING LOSS OF BOTH EARS: Status: ACTIVE | Noted: 2019-08-30

## 2022-08-26 PROBLEM — B97.89 VIRAL RESPIRATORY ILLNESS: Status: ACTIVE | Noted: 2020-05-11

## 2022-08-26 PROBLEM — J98.8 VIRAL RESPIRATORY ILLNESS: Status: ACTIVE | Noted: 2020-05-11

## 2022-08-26 PROBLEM — N93.9 ABNORMAL UTERINE BLEEDING (AUB): Status: ACTIVE | Noted: 2017-08-17

## 2022-08-26 PROBLEM — R05.9 COUGH: Status: ACTIVE | Noted: 2017-08-11

## 2022-08-26 PROBLEM — T14.8XXA MUSCLE STRAIN: Status: ACTIVE | Noted: 2021-12-17

## 2022-08-26 PROBLEM — J30.2 SEASONAL ALLERGIES: Status: ACTIVE | Noted: 2021-02-24

## 2022-08-26 PROBLEM — K59.00 CONSTIPATION: Status: ACTIVE | Noted: 2021-11-29

## 2022-08-26 PROBLEM — U07.1 COVID-19 VIRUS INFECTION: Status: ACTIVE | Noted: 2022-01-11

## 2022-08-26 PROBLEM — M75.42 IMPINGEMENT SYNDROME OF LEFT SHOULDER: Status: ACTIVE | Noted: 2021-12-17

## 2022-08-26 PROBLEM — K64.4 EXTERNAL HEMORRHOIDS: Status: ACTIVE | Noted: 2021-11-29

## 2022-08-26 PROBLEM — T78.3XXA ANGIOEDEMA: Status: ACTIVE | Noted: 2019-08-30

## 2022-08-26 PROBLEM — N92.0 MENORRHAGIA: Status: ACTIVE | Noted: 2017-08-14

## 2022-08-26 PROBLEM — I87.2 VENOUS INSUFFICIENCY: Status: ACTIVE | Noted: 2022-06-24

## 2022-08-26 PROBLEM — M25.512 CHRONIC LEFT SHOULDER PAIN: Status: ACTIVE | Noted: 2021-12-07

## 2022-08-26 PROBLEM — G89.29 CHRONIC LEFT SHOULDER PAIN: Status: ACTIVE | Noted: 2021-12-07

## 2022-08-26 PROBLEM — M54.50 LOWER BACK PAIN: Status: ACTIVE | Noted: 2020-05-11
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
## 2022-08-26 PROBLEM — H81.09 MENIERE'S DISEASE: Status: ACTIVE | Noted: 2018-08-28

## 2022-08-26 PROBLEM — R07.9 CHEST PAIN: Status: ACTIVE | Noted: 2020-06-18

## 2022-08-26 PROBLEM — M54.2 NECK PAIN: Status: ACTIVE | Noted: 2021-12-16

## 2022-08-26 PROBLEM — F32.3 MAJOR DEPRESSION WITH PSYCHOTIC FEATURES (HCC): Status: ACTIVE | Noted: 2020-07-28

## 2022-10-25 PROBLEM — B97.89 VIRAL RESPIRATORY ILLNESS: Status: RESOLVED | Noted: 2020-05-11 | Resolved: 2022-10-25

## 2022-10-25 PROBLEM — J98.8 VIRAL RESPIRATORY ILLNESS: Status: RESOLVED | Noted: 2020-05-11 | Resolved: 2022-10-25

## 2022-10-25 PROBLEM — R05.9 COUGH: Status: RESOLVED | Noted: 2017-08-11 | Resolved: 2022-10-25

## 2023-06-13 ENCOUNTER — HOSPITAL ENCOUNTER (EMERGENCY)
Facility: HOSPITAL | Age: 43
Discharge: HOME | End: 2023-06-13
Attending: EMERGENCY MEDICINE | Admitting: EMERGENCY MEDICINE
Payer: COMMERCIAL

## 2023-06-13 VITALS
DIASTOLIC BLOOD PRESSURE: 63 MMHG | SYSTOLIC BLOOD PRESSURE: 132 MMHG | WEIGHT: 136.91 LBS | OXYGEN SATURATION: 97 % | HEART RATE: 97 BPM | TEMPERATURE: 98.7 F | RESPIRATION RATE: 22 BRPM | BODY MASS INDEX: 27.65 KG/M2

## 2023-06-13 DIAGNOSIS — T78.40XA ACUTE ALLERGIC REACTION, INITIAL ENCOUNTER: Primary | ICD-10-CM

## 2023-06-13 RX ORDER — PREDNISONE 20 MG/1
40 TABLET ORAL DAILY
Qty: 10 TABLET | Refills: 0 | Status: SHIPPED | OUTPATIENT
Start: 2023-06-13 | End: 2023-06-16

## 2023-06-13 RX ORDER — ONDANSETRON 2 MG/ML
4 INJECTION INTRAMUSCULAR; INTRAVENOUS ONCE
Status: COMPLETED | OUTPATIENT
Start: 2023-06-13 | End: 2023-06-13

## 2023-06-13 RX ORDER — DIPHENHYDRAMINE HYDROCHLORIDE 50 MG/ML
25 INJECTION INTRAMUSCULAR; INTRAVENOUS ONCE
Status: COMPLETED | OUTPATIENT
Start: 2023-06-13 | End: 2023-06-13

## 2023-06-13 RX ORDER — METHYLPREDNISOLONE SODIUM SUCCINATE 125 MG/2ML
125 INJECTION, POWDER, LYOPHILIZED, FOR SOLUTION INTRAMUSCULAR; INTRAVENOUS ONCE
Status: COMPLETED | OUTPATIENT
Start: 2023-06-13 | End: 2023-06-13

## 2023-06-13 RX ORDER — FAMOTIDINE 20 MG/1
20 TABLET, FILM COATED ORAL 2 TIMES DAILY
Qty: 30 TABLET | Refills: 0 | Status: ON HOLD | OUTPATIENT
Start: 2023-06-13 | End: 2023-06-16 | Stop reason: SDUPTHER

## 2023-06-13 RX ORDER — EPINEPHRINE 0.3 MG/.3ML
0.3 INJECTION SUBCUTANEOUS ONCE
Qty: 0.6 ML | Refills: 0 | Status: SHIPPED | OUTPATIENT
Start: 2023-06-13 | End: 2023-06-14

## 2023-06-13 RX ORDER — EPINEPHRINE 1 MG/ML
0.3 INJECTION, SOLUTION, CONCENTRATE INTRAVENOUS ONCE
Status: COMPLETED | OUTPATIENT
Start: 2023-06-13 | End: 2023-06-13

## 2023-06-13 RX ORDER — FAMOTIDINE 10 MG/ML
20 INJECTION, SOLUTION INTRAVENOUS ONCE
Status: COMPLETED | OUTPATIENT
Start: 2023-06-13 | End: 2023-06-13

## 2023-06-13 RX ADMIN — EPINEPHRINE 0.3 MG: 1 INJECTION, SOLUTION, CONCENTRATE INTRAVENOUS at 21:35

## 2023-06-13 RX ADMIN — DIPHENHYDRAMINE HYDROCHLORIDE 25 MG: 50 INJECTION, SOLUTION INTRAMUSCULAR; INTRAVENOUS at 21:40

## 2023-06-13 RX ADMIN — FAMOTIDINE 20 MG: 10 INJECTION INTRAVENOUS at 21:42

## 2023-06-13 RX ADMIN — METHYLPREDNISOLONE SODIUM SUCCINATE 125 MG: 125 INJECTION, POWDER, FOR SOLUTION INTRAMUSCULAR; INTRAVENOUS at 21:40

## 2023-06-13 RX ADMIN — ONDANSETRON 4 MG: 2 INJECTION INTRAMUSCULAR; INTRAVENOUS at 22:54

## 2023-06-14 ENCOUNTER — HOSPITAL ENCOUNTER (OUTPATIENT)
Facility: HOSPITAL | Age: 43
Setting detail: OBSERVATION
Discharge: HOME | End: 2023-06-16
Attending: EMERGENCY MEDICINE | Admitting: INTERNAL MEDICINE
Payer: COMMERCIAL

## 2023-06-14 DIAGNOSIS — E87.6 HYPOKALEMIA: ICD-10-CM

## 2023-06-14 DIAGNOSIS — R21 RASH: ICD-10-CM

## 2023-06-14 DIAGNOSIS — T78.40XA ACUTE ALLERGIC REACTION, INITIAL ENCOUNTER: Primary | ICD-10-CM

## 2023-06-14 DIAGNOSIS — L50.8 CHRONIC URTICARIA: ICD-10-CM

## 2023-06-14 PROBLEM — I10 HTN (HYPERTENSION): Status: ACTIVE | Noted: 2023-06-14

## 2023-06-14 LAB
ANION GAP SERPL CALCULATED.3IONS-SCNC: 10 MMOL/L (ref 4–13)
BASOPHILS # BLD AUTO: 0.02 THOUSANDS/ÂΜL (ref 0–0.1)
BASOPHILS NFR BLD AUTO: 0 % (ref 0–1)
BUN SERPL-MCNC: 7 MG/DL (ref 5–25)
CALCIUM SERPL-MCNC: 9.1 MG/DL (ref 8.4–10.2)
CHLORIDE SERPL-SCNC: 103 MMOL/L (ref 96–108)
CO2 SERPL-SCNC: 23 MMOL/L (ref 21–32)
CREAT SERPL-MCNC: 0.24 MG/DL (ref 0.6–1.3)
EOSINOPHIL # BLD AUTO: 0.01 THOUSAND/ÂΜL (ref 0–0.61)
EOSINOPHIL NFR BLD AUTO: 0 % (ref 0–6)
ERYTHROCYTE [DISTWIDTH] IN BLOOD BY AUTOMATED COUNT: 12.8 % (ref 11.6–15.1)
GFR SERPL CREATININE-BSD FRML MDRD: 150 ML/MIN/1.73SQ M
GLUCOSE SERPL-MCNC: 97 MG/DL (ref 65–140)
HCT VFR BLD AUTO: 38.1 % (ref 34.8–46.1)
HGB BLD-MCNC: 12.6 G/DL (ref 11.5–15.4)
IMM GRANULOCYTES # BLD AUTO: 0.03 THOUSAND/UL (ref 0–0.2)
IMM GRANULOCYTES NFR BLD AUTO: 0 % (ref 0–2)
LYMPHOCYTES # BLD AUTO: 2.12 THOUSANDS/ÂΜL (ref 0.6–4.47)
LYMPHOCYTES NFR BLD AUTO: 24 % (ref 14–44)
MCH RBC QN AUTO: 26.5 PG (ref 26.8–34.3)
MCHC RBC AUTO-ENTMCNC: 33.1 G/DL (ref 31.4–37.4)
MCV RBC AUTO: 80 FL (ref 82–98)
MONOCYTES # BLD AUTO: 0.85 THOUSAND/ÂΜL (ref 0.17–1.22)
MONOCYTES NFR BLD AUTO: 10 % (ref 4–12)
NEUTROPHILS # BLD AUTO: 5.77 THOUSANDS/ÂΜL (ref 1.85–7.62)
NEUTS SEG NFR BLD AUTO: 66 % (ref 43–75)
NRBC BLD AUTO-RTO: 0 /100 WBCS
PLATELET # BLD AUTO: 339 THOUSANDS/UL (ref 149–390)
PMV BLD AUTO: 10.3 FL (ref 8.9–12.7)
POTASSIUM SERPL-SCNC: 3.1 MMOL/L (ref 3.5–5.3)
RBC # BLD AUTO: 4.76 MILLION/UL (ref 3.81–5.12)
SODIUM SERPL-SCNC: 136 MMOL/L (ref 135–147)
WBC # BLD AUTO: 8.8 THOUSAND/UL (ref 4.31–10.16)

## 2023-06-14 PROCEDURE — 96374 THER/PROPH/DIAG INJ IV PUSH: CPT

## 2023-06-14 PROCEDURE — 99223 1ST HOSP IP/OBS HIGH 75: CPT | Performed by: INTERNAL MEDICINE

## 2023-06-14 PROCEDURE — 99284 EMERGENCY DEPT VISIT MOD MDM: CPT

## 2023-06-14 PROCEDURE — 96375 TX/PRO/DX INJ NEW DRUG ADDON: CPT

## 2023-06-14 PROCEDURE — 36415 COLL VENOUS BLD VENIPUNCTURE: CPT

## 2023-06-14 PROCEDURE — 85025 COMPLETE CBC W/AUTO DIFF WBC: CPT

## 2023-06-14 PROCEDURE — 96372 THER/PROPH/DIAG INJ SC/IM: CPT

## 2023-06-14 PROCEDURE — 80048 BASIC METABOLIC PNL TOTAL CA: CPT

## 2023-06-14 RX ORDER — ACETAMINOPHEN 325 MG/1
650 TABLET ORAL EVERY 6 HOURS PRN
Status: DISCONTINUED | OUTPATIENT
Start: 2023-06-14 | End: 2023-06-16 | Stop reason: HOSPADM

## 2023-06-14 RX ORDER — LORATADINE 10 MG/1
10 TABLET ORAL DAILY
Status: DISCONTINUED | OUTPATIENT
Start: 2023-06-14 | End: 2023-06-16 | Stop reason: HOSPADM

## 2023-06-14 RX ORDER — POTASSIUM CHLORIDE 20 MEQ/1
40 TABLET, EXTENDED RELEASE ORAL ONCE
Status: COMPLETED | OUTPATIENT
Start: 2023-06-14 | End: 2023-06-15

## 2023-06-14 RX ORDER — PANTOPRAZOLE SODIUM 20 MG/1
20 TABLET, DELAYED RELEASE ORAL
Status: DISCONTINUED | OUTPATIENT
Start: 2023-06-15 | End: 2023-06-16 | Stop reason: HOSPADM

## 2023-06-14 RX ORDER — AMLODIPINE BESYLATE 2.5 MG/1
2.5 TABLET ORAL DAILY
Status: DISCONTINUED | OUTPATIENT
Start: 2023-06-15 | End: 2023-06-16 | Stop reason: HOSPADM

## 2023-06-14 RX ORDER — METHYLPREDNISOLONE SODIUM SUCCINATE 125 MG/2ML
60 INJECTION, POWDER, LYOPHILIZED, FOR SOLUTION INTRAMUSCULAR; INTRAVENOUS DAILY
Status: DISCONTINUED | OUTPATIENT
Start: 2023-06-15 | End: 2023-06-16 | Stop reason: HOSPADM

## 2023-06-14 RX ORDER — ONDANSETRON 2 MG/ML
4 INJECTION INTRAMUSCULAR; INTRAVENOUS EVERY 6 HOURS PRN
Status: DISCONTINUED | OUTPATIENT
Start: 2023-06-14 | End: 2023-06-16 | Stop reason: HOSPADM

## 2023-06-14 RX ORDER — METHYLPREDNISOLONE SODIUM SUCCINATE 125 MG/2ML
125 INJECTION, POWDER, LYOPHILIZED, FOR SOLUTION INTRAMUSCULAR; INTRAVENOUS ONCE
Status: COMPLETED | OUTPATIENT
Start: 2023-06-14 | End: 2023-06-14

## 2023-06-14 RX ORDER — HYDROCHLOROTHIAZIDE 25 MG/1
25 TABLET ORAL DAILY
Status: DISCONTINUED | OUTPATIENT
Start: 2023-06-15 | End: 2023-06-16 | Stop reason: HOSPADM

## 2023-06-14 RX ORDER — FAMOTIDINE 10 MG/ML
20 INJECTION, SOLUTION INTRAVENOUS ONCE
Status: COMPLETED | OUTPATIENT
Start: 2023-06-14 | End: 2023-06-14

## 2023-06-14 RX ORDER — METOPROLOL SUCCINATE 50 MG/1
50 TABLET, EXTENDED RELEASE ORAL DAILY
Status: DISCONTINUED | OUTPATIENT
Start: 2023-06-15 | End: 2023-06-16 | Stop reason: HOSPADM

## 2023-06-14 RX ORDER — HYDROXYZINE HYDROCHLORIDE 25 MG/1
25 TABLET, FILM COATED ORAL 2 TIMES DAILY
Status: DISCONTINUED | OUTPATIENT
Start: 2023-06-14 | End: 2023-06-16 | Stop reason: HOSPADM

## 2023-06-14 RX ORDER — LORATADINE 10 MG/1
10 TABLET ORAL DAILY
Status: DISCONTINUED | OUTPATIENT
Start: 2023-06-15 | End: 2023-06-14

## 2023-06-14 RX ORDER — FAMOTIDINE 20 MG/1
20 TABLET, FILM COATED ORAL 2 TIMES DAILY
Status: DISCONTINUED | OUTPATIENT
Start: 2023-06-15 | End: 2023-06-16 | Stop reason: HOSPADM

## 2023-06-14 RX ORDER — ALBUTEROL SULFATE 90 UG/1
2 AEROSOL, METERED RESPIRATORY (INHALATION) EVERY 4 HOURS PRN
Status: DISCONTINUED | OUTPATIENT
Start: 2023-06-14 | End: 2023-06-16 | Stop reason: HOSPADM

## 2023-06-14 RX ORDER — EPINEPHRINE 1 MG/ML
0.15 INJECTION, SOLUTION, CONCENTRATE INTRAVENOUS ONCE
Status: COMPLETED | OUTPATIENT
Start: 2023-06-14 | End: 2023-06-14

## 2023-06-14 RX ADMIN — LORATADINE 10 MG: 10 TABLET ORAL at 19:54

## 2023-06-14 RX ADMIN — HYDROXYZINE HYDROCHLORIDE 25 MG: 25 TABLET, FILM COATED ORAL at 22:21

## 2023-06-14 RX ADMIN — FAMOTIDINE 20 MG: 10 INJECTION INTRAVENOUS at 19:23

## 2023-06-14 RX ADMIN — METHYLPREDNISOLONE SODIUM SUCCINATE 125 MG: 125 INJECTION, POWDER, FOR SOLUTION INTRAMUSCULAR; INTRAVENOUS at 19:22

## 2023-06-14 RX ADMIN — EPINEPHRINE 0.15 MG: 1 INJECTION, SOLUTION, CONCENTRATE INTRAVENOUS at 19:19

## 2023-06-14 NOTE — DISCHARGE INSTRUCTIONS
Please take predisone (steroid) and pepcid (stomach histamine blocker) for the next five days  Continue to monitor symptoms including swelling and breathing at home  Please follow up with your PCP and allergist   Please return to the Emergency Department if you experience worsening of your current symptoms, worse swelling, difficulty breathing, or any other concerning symptoms  YOU MUST RETURN TO THE EMERGENCY DEPARTMENT IMMEDIATELY IF YOU USE YOUR [facility] Jesus

## 2023-06-14 NOTE — LETTER
[address]  Dept: [phone]    June 16, 2023     Patient: Julieta Villanueva   YOB: 1980   Date of Visit: 6/14/2023       To Whom it May Concern:    Taylor Villanueva is under my professional care  She was seen in the hospital from 6/14/2023 to 06/16/23  She may return to work on 06/19/2023 without limitations  If you have any questions or concerns, please don't hesitate to call           Sincerely,          Woody Acharya MD

## 2023-06-14 NOTE — ED PROVIDER NOTES
"History  Chief Complaint   Patient presents with   • Allergic Reaction     Pt reports allergic reaction to shrimp, pt lips swollen and rash  HPI   Patient is a 50-year-old female presents ED for evaluation of allergic reaction  Patient states she has a known allergy to shrimp and began developing symptoms after eating crab at dinner  Patient reports she noticed she began to have lip swelling, lower extremity swelling, and red and itchy rash to the arms/legs/torso  Patient's daughter at bedside states she has had a voice change  Patient states her tongue feels \"heavy\" without difficulty breathing  Patient states she took an antihistamine at [facility] but cannot recall the name  Patient denies any throat swelling, drooling, shortness of breath, fevers or chills  Prior to Admission Medications   Prescriptions Last Dose Informant Patient Reported? Taking?    Cholecalciferol (VITAMIN D PO) Not Taking Self Yes No   Sig: Take by mouth   Patient not taking: Reported on 6/13/2023   albuterol (ProAir HFA) 90 mcg/act inhaler   No Yes   Sig: Inhale 2 puffs every 4 (four) hours as needed for shortness of breath   amLODIPine (NORVASC) 10 mg tablet   Yes Yes   amLODIPine (NORVASC) 2 5 mg tablet   Yes Yes   Sig: Take 2 5 mg by mouth daily   azithromycin (ZITHROMAX) 250 mg tablet Not Taking  Yes No   Patient not taking: Reported on 6/13/2023   hydrochlorothiazide (HYDRODIURIL) 25 mg tablet   Yes No   Sig: Take 25 mg by mouth daily   ibuprofen (MOTRIN) 600 mg tablet   Yes Yes   Sig: Take 600 mg by mouth every 6 (six) hours as needed   lidocaine (LIDODERM) 5 % Not Taking  No No   Sig: Apply 1 patch topically daily Remove & Discard patch within 12 hours or as directed by MD   Patient not taking: Reported on 2/25/2020   methocarbamol (ROBAXIN) 500 mg tablet Not Taking  No No   Sig: Take 1 tablet (500 mg total) by mouth 2 (two) times a day   Patient not taking: Reported on 6/13/2023   methocarbamol (ROBAXIN) 500 mg tablet Not " Taking  Yes No   Patient not taking: Reported on 6/13/2023   metoprolol succinate (TOPROL-XL) 50 mg 24 hr tablet   Yes Yes   Sig: Take 50 mg by mouth daily   metoprolol succinate (TOPROL-XL) 50 mg 24 hr tablet   Yes Yes   multivitamin (THERAGRAN) TABS Not Taking Self Yes No   Sig: Take 1 tablet by mouth daily   Patient not taking: Reported on 6/13/2023   omeprazole (PriLOSEC) 20 mg delayed release capsule   Yes No   Sig: Take 20 mg by mouth daily      Facility-Administered Medications: None       Past Medical History:   Diagnosis Date   • Allergic    • Anemia    • HL (hearing loss)    • Hypertension        History reviewed  No pertinent surgical history  Family History   Problem Relation Age of Onset   • No Known Problems Mother      I have reviewed and agree with the history as documented  E-Cigarette/Vaping     E-Cigarette/Vaping Substances     Social History     Tobacco Use   • Smoking status: Never   • Smokeless tobacco: Never   Substance Use Topics   • Alcohol use: Never   • Drug use: Never        Review of Systems   Constitutional: Negative for chills and fever  HENT: Positive for facial swelling  Negative for congestion and sore throat  Eyes: Negative for pain and visual disturbance  Respiratory: Negative for cough and shortness of breath  Cardiovascular: Negative for chest pain and palpitations  Gastrointestinal: Negative for abdominal pain, diarrhea, nausea and vomiting  Genitourinary: Negative for dysuria and hematuria  Musculoskeletal: Negative for back pain and myalgias  Skin: Positive for rash  Negative for color change  Neurological: Negative for dizziness and headaches  All other systems reviewed and are negative        Physical Exam  ED Triage Vitals [06/13/23 2111]   Temperature Pulse Respirations Blood Pressure SpO2   98 7 °F (37 1 °C) 87 20 127/76 100 %      Temp Source Heart Rate Source Patient Position - Orthostatic VS BP Location FiO2 (%)   Oral Monitor Sitting Left arm --      Pain Score       --             Orthostatic Vital Signs  Vitals:    06/13/23 2111 06/13/23 2220 06/13/23 2230 06/13/23 2300   BP: 127/76 126/62 138/61 132/63   Pulse: 87 103 101 97   Patient Position - Orthostatic VS: Sitting Lying Lying        Physical Exam  Vitals and nursing note reviewed  Constitutional:       General: She is not in acute distress  Appearance: She is not diaphoretic  HENT:      Head: Normocephalic and atraumatic  Nose: No congestion or rhinorrhea  Mouth/Throat:      Mouth: Mucous membranes are moist       Pharynx: Oropharynx is clear  Comments: Mild lip swelling noted  No evidence of airway obstruction  Eyes:      General: No scleral icterus  Extraocular Movements: Extraocular movements intact  Conjunctiva/sclera: Conjunctivae normal    Cardiovascular:      Rate and Rhythm: Normal rate and regular rhythm  Pulses: Normal pulses  Heart sounds: Normal heart sounds  No murmur heard  Pulmonary:      Effort: Pulmonary effort is normal  No respiratory distress  Breath sounds: Normal breath sounds  No wheezing, rhonchi or rales  Abdominal:      General: There is no distension  Tenderness: There is no abdominal tenderness  There is no guarding or rebound  Musculoskeletal:         General: Swelling (Mild bilateral lower extremity swelling) present  No deformity or signs of injury  Normal range of motion  Cervical back: Normal range of motion and neck supple  Skin:     General: Skin is warm  Capillary Refill: Capillary refill takes less than 2 seconds  Coloration: Skin is not pale  Findings: Rash present  No bruising  Rash is urticarial (Diffuse to bilateral upper and lower extremities and torso)  Neurological:      Mental Status: She is alert  Mental status is at baseline     Psychiatric:         Mood and Affect: Mood normal          Behavior: Behavior normal          ED Medications  Medications   EPINEPHrine PF (ADRENALIN) 1 mg/mL injection 0 3 mg (0 3 mg Intramuscular Given 6/13/23 2135)   diphenhydrAMINE (BENADRYL) injection 25 mg (25 mg Intravenous Given 6/13/23 2140)   methylPREDNISolone sodium succinate (Solu-MEDROL) injection 125 mg (125 mg Intravenous Given 6/13/23 2140)   Famotidine (PF) (PEPCID) injection 20 mg (20 mg Intravenous Given 6/13/23 2142)   ondansetron (ZOFRAN) injection 4 mg (4 mg Intravenous Given 6/13/23 2254)       Diagnostic Studies  Results Reviewed     None                 No orders to display         Procedures  Procedures      ED Course                             SBIRT 22yo+    Flowsheet Row Most Recent Value   Initial Alcohol Screen: US AUDIT-C     1  How often do you have a drink containing alcohol? 0 Filed at: 06/13/2023 2148   2  How many drinks containing alcohol do you have on a typical day you are drinking? 0 Filed at: 06/13/2023 2148   3a  Male UNDER 65: How often do you have five or more drinks on one occasion? 0 Filed at: 06/13/2023 2148   3b  FEMALE Any Age, or MALE 65+: How often do you have 4 or more drinks on one occassion? 0 Filed at: 06/13/2023 2148   Audit-C Score 0 Filed at: 06/13/2023 2148   DOMONIQUE: How many times in the past year have you    Used an illegal drug or used a prescription medication for non-medical reasons? Never Filed at: 06/13/2023 2148                Medical Decision Making  Acute allergic reaction, initial encounter: acute illness or injury  Amount and/or Complexity of Data Reviewed  Independent Historian:      Details: Daughter at bedside      Risk  OTC drugs  Prescription drug management  Patient is a 50-year-old female presents the ED for evaluation of allergic reaction  Known shellfish allergy, likely allergic reaction versus anaphylaxis  Doubt hereditary angioedema and scombroid due to history and physical exam   Will treat symptomatically with epinephrine, steroids, Benadryl, Pepcid      Patient reevaluated, reports mild nausea but overall improving symptoms  Patient treated with Zofran 4 mg IV  Patient reevaluated, reports significant improvement in symptoms  Currently requesting discharge home  Denies any new or worsening complaints or concerns at this time  Discussed results and plan with patient  Advised on need for outpatient follow up, given information  Given return precautions verbally and in discharge instructions  Given prescription for prednisone, Pepcid, and EpiPen and advised on proper use  All questions answered  Patient expressed verbal understanding and is agreeable with plan for discharge with outpatient follow up  Disposition  Final diagnoses:   Acute allergic reaction, initial encounter     Time reflects when diagnosis was documented in both MDM as applicable and the Disposition within this note     Time User Action Codes Description Comment    6/13/2023 11:11 PM Zenon Porter Add [[id number]] Acute allergic reaction, initial encounter       ED Disposition     ED Disposition   Discharge    Condition   Stable    Date/Time   Tue Jun 13, 2023 2311    [address] [place] [address] discharge to home/self care                 Follow-up Information     Follow up With Specialties Details Why Contact Info Additional [address] [place] [address] MD Tha Internal Medicine Schedule an appointment as soon as possible for a visit in 3 days  [facility]        [address]  Emergency Department Emergency Medicine Go to  If symptoms worsen [facility] [phone]  [address] [place] [address] Emergency Department, [address] [place] [address] Ave  , [place], [place], Yousif  Allergy, Asthma & Immunology Allergy   [id number] [address]  [address] [place] [address], Asthma & Immunology, [address] [place] [address]          Discharge Medication List as of 6/13/2023 11:15 PM      START taking these medications    Details   EPINEPHrine (EPIPEN) 0 3 mg/0 3 mL SOAJ Inject 0 3 mL (0 3 mg total) into a muscle once for 1 dose, Starting Tue 6/13/2023, Print      famotidine (PEPCID) 20 mg tablet Take 1 tablet (20 mg total) by mouth 2 (two) times a day, Starting Tue 6/13/2023, Print      predniSONE 20 mg tablet Take 2 tablets (40 mg total) by mouth daily for 5 days, Starting Tue 6/13/2023, Until Sun 6/18/2023, Print         CONTINUE these medications which have NOT CHANGED    Details   albuterol (ProAir HFA) 90 mcg/act inhaler Inhale 2 puffs every 4 (four) hours as needed for shortness of breath, Starting Sat 4/23/2022, Print      !! amLODIPine (NORVASC) 10 mg tablet Starting Tue 2/18/2020, Historical Med      !! amLODIPine (NORVASC) 2 5 mg tablet Take 2 5 mg by mouth daily, Historical Med      ibuprofen (MOTRIN) 600 mg tablet Take 600 mg by mouth every 6 (six) hours as needed, Starting Mon 8/8/2022, Until Tue 8/8/2023 at 2359, Historical Med      !! metoprolol succinate (TOPROL-XL) 50 mg 24 hr tablet Take 50 mg by mouth daily, Historical Med      !! metoprolol succinate (TOPROL-XL) 50 mg 24 hr tablet Starting Thu 1/30/2020, Historical Med      azithromycin (ZITHROMAX) 250 mg tablet Starting Sat 4/23/2022, Historical Med      Cholecalciferol (VITAMIN D PO) Take by mouth, Historical Med      hydrochlorothiazide (HYDRODIURIL) 25 mg tablet Take 25 mg by mouth daily, Starting Fri 8/30/2019, Until Sat 8/29/2020, Historical Med      lidocaine (LIDODERM) 5 % Apply 1 patch topically daily Remove & Discard patch within 12 hours or as directed by MD, Starting Tue 2/4/2020, Print      !! methocarbamol (ROBAXIN) 500 mg tablet Take 1 tablet (500 mg total) by mouth 2 (two) times a day, Starting Tue 2/4/2020, Print      !! methocarbamol (ROBAXIN) 500 mg tablet Starting Tue 2/4/2020, Historical Med      multivitamin (THERAGRAN) TABS Take 1 tablet by mouth daily, Starting Thu 9/21/2017, Historical Med omeprazole (PriLOSEC) 20 mg delayed release capsule Take 20 mg by mouth daily, Starting Mon 2/14/2022, Until Tue 2/14/2023, Historical Med       !! - Potential duplicate medications found  Please discuss with provider  No discharge procedures on file  PDMP Review     None           ED Provider  Attending physically available and evaluated Isabelle Villanueva I managed the patient along with the ED Attending      Electronically Signed by         Liseth Carpenter DO  06/14/23 [id number]

## 2023-06-14 NOTE — ED ATTENDING ATTESTATION
6/13/2023  IFord MD, saw and evaluated the patient  I have discussed the patient with the resident/non-physician practitioner and agree with the resident's/non-physician practitioner's findings, Plan of Care, and MDM as documented in the resident's/non-physician practitioner's note, except where noted  All available labs and Radiology studies were reviewed  I was present for key portions of any procedure(s) performed by the resident/non-physician practitioner and I was immediately available to provide assistance  At this point I agree with the current assessment done in the Emergency Department  I have conducted an independent evaluation of this patient a history and physical is as follows:      Final Diagnosis:  1  Acute allergic reaction, initial encounter      Chief Complaint   Patient presents with   • Allergic Reaction     Pt reports allergic reaction to shrimp, pt lips swollen and rash  26-year-old female who presents with allergic reaction  Patient accidentally ate crab when she was allergic to shellfish  Took allergy medicine from [facility]   Complaining of lip swelling, change in voice, lower extremity swelling, rash  PMH:  -Shellfish allergy  PSH:  -Not applicable      PE:   Vitals:    06/13/23 2111 06/13/23 2220 06/13/23 2230 06/13/23 2300   BP: 127/76 126/62 138/61 132/63   BP Location: Left arm Right arm Right arm Right arm   Pulse: 87 103 101 97   Resp: 20 18 (!) 25 22   Temp: 98 7 °F (37 1 °C)      TempSrc: Oral      SpO2: 100% 98% 98% 97%   Weight: 62 1 kg (136 lb 14 5 oz)            Constitutional: Vital signs are normal  She appears well-developed  She is cooperative  No distress  HENT:   Mouth/Throat: Uvula is midline, oropharynx is clear and moist and mucous membranes are normal   Angioedema of lips  Oropharynx is clear without swelling  No tongue swelling  Airway is patent  Tolerating secretions  Eyes: Pupils are equal, round, and reactive to light  Conjunctivae and EOM are normal    Neck: Trachea normal  No thyroid mass and no thyromegaly present  Cardiovascular: Normal rate, regular rhythm, normal heart sounds  No murmur heard  Pulmonary/Chest: Effort normal and breath sounds normal    Abdominal: Soft  Normal appearance and bowel sounds are normal  There is no tenderness  There is no rebound, no guarding  Neurological: She is alert  Skin: Skin is warm, dry and intact  Urticaria noted  Psychiatric: She has a normal mood and affect  Her speech is normal and behavior is normal  Thought content normal       A:  -41-year-old female who presents with allergic reaction  P:  -Likely anaphylaxis due to shellfish allergy  Will treat with IM epinephrine  Will give IV Solu-Medrol, Benadryl, Pepcid  Plan to observe in the emergency department for rebound anaphylaxis and need for more IM epinephrine  Likely discharge on prednisone, Pepcid, Benadryl, EpiPen  - 13 point ROS was performed and all are normal unless stated in the history above  - Nursing note reviewed  Vitals reviewed  - Orders placed by myself and/or advanced practitioner / resident     - Previous chart was reviewed  - No language barrier    - History obtained from patient  - There are no limitations to the history obtained  - Critical care time: 31 minutes            Medications   EPINEPHrine PF (ADRENALIN) 1 mg/mL injection 0 3 mg (0 3 mg Intramuscular Given 6/13/23 2135)   diphenhydrAMINE (BENADRYL) injection 25 mg (25 mg Intravenous Given 6/13/23 2140)   methylPREDNISolone sodium succinate (Solu-MEDROL) injection 125 mg (125 mg Intravenous Given 6/13/23 2140)   Famotidine (PF) (PEPCID) injection 20 mg (20 mg Intravenous Given 6/13/23 2142)   ondansetron (ZOFRAN) injection 4 mg (4 mg Intravenous Given 6/13/23 2254)     No orders to display     Orders Placed This Encounter   Procedures   • Critical Care     Labs Reviewed - No data to display  Time reflects when diagnosis was documented in both MDM as applicable and the Disposition within this note     Time User Action Codes Description Comment    6/13/2023 11:11 PM Ivette Porter Add [[id number]] Acute allergic reaction, initial encounter       ED Disposition     ED Disposition   Discharge    Condition   Stable    Date/Time   Tue Jun 13, 2023 11:11 PM    Comment   Mariel Villanueva discharge to home/self care                 Follow-up Information     Follow up With Specialties Details Why Contact Info Additional [address] [place] [address] MD Tha Internal Medicine Schedule an appointment as soon as possible for a visit in 3 days  [address] [place] [address]        [address] Emergency Department Emergency Medicine Go to  If symptoms worsen [facility] [phone]  [facility] Emergency Department, [address] [place] [address] , [place], [place], [place] [address] Allergy, Asthma & Immunology Allergy   [address] [place] [phone]  [address] [place] [address], Asthma & Immunology, [address] [place] [address], [facility], [place] [address]        Discharge Medication List as of 6/13/2023 11:15 PM      START taking these medications    Details   EPINEPHrine (EPIPEN) 0 3 mg/0 3 mL SOAJ Inject 0 3 mL (0 3 mg total) into a muscle once for 1 dose, Starting Tue 6/13/2023, Print      famotidine (PEPCID) 20 mg tablet Take 1 tablet (20 mg total) by mouth 2 (two) times a day, Starting Tue 6/13/2023, Print      predniSONE 20 mg tablet Take 2 tablets (40 mg total) by mouth daily for 5 days, Starting Tue 6/13/2023, Until Sun 6/18/2023, Print         CONTINUE these medications which have NOT CHANGED    Details   albuterol (ProAir HFA) 90 mcg/act inhaler Inhale 2 puffs every 4 (four) hours as needed for shortness of breath, Starting Sat 4/23/2022, Print      !! amLODIPine (NORVASC) 10 mg tablet Starting Tue 2/18/2020, Historical Med      !! amLODIPine (NORVASC) 2 5 mg tablet Take 2 5 mg by mouth daily, Historical Med      ibuprofen (MOTRIN) 600 mg tablet Take 600 mg by mouth every 6 (six) hours as needed, Starting Mon 8/8/2022, Until Tue 8/8/2023 at 2359, Historical Med      !! metoprolol succinate (TOPROL-XL) 50 mg 24 hr tablet Take 50 mg by mouth daily, Historical Med      !! metoprolol succinate (TOPROL-XL) 50 mg 24 hr tablet Starting Thu 1/30/2020, Historical Med      azithromycin (ZITHROMAX) 250 mg tablet Starting Sat 4/23/2022, Historical Med      Cholecalciferol (VITAMIN D PO) Take by mouth, Historical Med      hydrochlorothiazide (HYDRODIURIL) 25 mg tablet Take 25 mg by mouth daily, Starting Fri 8/30/2019, Until Sat 8/29/2020, Historical Med      lidocaine (LIDODERM) 5 % Apply 1 patch topically daily Remove & Discard patch within 12 hours or as directed by MD, Starting Tue 2/4/2020, Print      !! methocarbamol (ROBAXIN) 500 mg tablet Take 1 tablet (500 mg total) by mouth 2 (two) times a day, Starting Tue 2/4/2020, Print      !! methocarbamol (ROBAXIN) 500 mg tablet Starting Tue 2/4/2020, Historical Med      multivitamin (THERAGRAN) TABS Take 1 tablet by mouth daily, Starting Thu 9/21/2017, Historical Med      omeprazole (PriLOSEC) 20 mg delayed release capsule Take 20 mg by mouth daily, Starting Mon 2/14/2022, Until Tue 2/14/2023, Historical Med       !! - Potential duplicate medications found  Please discuss with provider  No discharge procedures on file  Prior to Admission Medications   Prescriptions Last Dose Informant Patient Reported? Taking?    Cholecalciferol (VITAMIN D PO) Not Taking Self Yes No   Sig: Take by mouth   Patient not taking: Reported on 6/13/2023   albuterol (ProAir HFA) 90 mcg/act inhaler   No Yes   Sig: Inhale 2 puffs every 4 (four) hours as needed for shortness of breath   amLODIPine (NORVASC) 10 mg tablet   Yes Yes   amLODIPine (NORVASC) 2 5 mg tablet   Yes Yes   Sig: Take 2 5 mg "by mouth daily   azithromycin (ZITHROMAX) 250 mg tablet Not Taking  Yes No   Patient not taking: Reported on 6/13/2023   hydrochlorothiazide (HYDRODIURIL) 25 mg tablet   Yes No   Sig: Take 25 mg by mouth daily   ibuprofen (MOTRIN) 600 mg tablet   Yes Yes   Sig: Take 600 mg by mouth every 6 (six) hours as needed   lidocaine (LIDODERM) 5 % Not Taking  No No   Sig: Apply 1 patch topically daily Remove & Discard patch within 12 hours or as directed by MD   Patient not taking: Reported on 2/25/2020   methocarbamol (ROBAXIN) 500 mg tablet Not Taking  No No   Sig: Take 1 tablet (500 mg total) by mouth 2 (two) times a day   Patient not taking: Reported on 6/13/2023   methocarbamol (ROBAXIN) 500 mg tablet Not Taking  Yes No   Patient not taking: Reported on 6/13/2023   metoprolol succinate (TOPROL-XL) 50 mg 24 hr tablet   Yes Yes   Sig: Take 50 mg by mouth daily   metoprolol succinate (TOPROL-XL) 50 mg 24 hr tablet   Yes Yes   multivitamin (THERAGRAN) TABS Not Taking Self Yes No   Sig: Take 1 tablet by mouth daily   Patient not taking: Reported on 6/13/2023   omeprazole (PriLOSEC) 20 mg delayed release capsule   Yes No   Sig: Take 20 mg by mouth daily      Facility-Administered Medications: None       Portions of the record may have been created with voice recognition software  Occasional wrong word or \"sound a like\" substitutions may have occurred due to the inherent limitations of voice recognition software  Read the chart carefully and recognize, using context, where substitutions have occurred        ED Course         Critical Care Time  CriticalCare Time    Date/Time: 6/13/2023 11:27 PM    Performed by: Ajith Alexandra MD  Authorized by: Ajith Alexandra MD    Critical care provider statement:     Critical care time (minutes):  31    Critical care time was exclusive of:  Separately billable procedures and treating other patients    Critical care was necessary to treat or prevent imminent or life-threatening " deterioration of the following conditions:  Shock    Critical care was time spent personally by me on the following activities:  Obtaining history from patient or surrogate, development of treatment plan with patient or surrogate, evaluation of patient's response to treatment, examination of patient and re-evaluation of patient's condition    I assumed direction of critical care for this patient from another provider in my specialty: no

## 2023-06-15 LAB
ANION GAP SERPL CALCULATED.3IONS-SCNC: 5 MMOL/L (ref 4–13)
BUN SERPL-MCNC: 10 MG/DL (ref 5–25)
CALCIUM SERPL-MCNC: 9.1 MG/DL (ref 8.4–10.2)
CHLORIDE SERPL-SCNC: 109 MMOL/L (ref 96–108)
CO2 SERPL-SCNC: 25 MMOL/L (ref 21–32)
CREAT SERPL-MCNC: 0.3 MG/DL (ref 0.6–1.3)
ERYTHROCYTE [DISTWIDTH] IN BLOOD BY AUTOMATED COUNT: 12.9 % (ref 11.6–15.1)
GFR SERPL CREATININE-BSD FRML MDRD: 139 ML/MIN/1.73SQ M
GLUCOSE SERPL-MCNC: 134 MG/DL (ref 65–140)
HCT VFR BLD AUTO: 39.2 % (ref 34.8–46.1)
HGB BLD-MCNC: 12.7 G/DL (ref 11.5–15.4)
MCH RBC QN AUTO: 26.1 PG (ref 26.8–34.3)
MCHC RBC AUTO-ENTMCNC: 32.4 G/DL (ref 31.4–37.4)
MCV RBC AUTO: 81 FL (ref 82–98)
PLATELET # BLD AUTO: 340 THOUSANDS/UL (ref 149–390)
PMV BLD AUTO: 10.5 FL (ref 8.9–12.7)
POTASSIUM SERPL-SCNC: 4.4 MMOL/L (ref 3.5–5.3)
RBC # BLD AUTO: 4.86 MILLION/UL (ref 3.81–5.12)
SODIUM SERPL-SCNC: 139 MMOL/L (ref 135–147)
WBC # BLD AUTO: 4.51 THOUSAND/UL (ref 4.31–10.16)

## 2023-06-15 PROCEDURE — 99232 SBSQ HOSP IP/OBS MODERATE 35: CPT | Performed by: STUDENT IN AN ORGANIZED HEALTH CARE EDUCATION/TRAINING PROGRAM

## 2023-06-15 PROCEDURE — 80048 BASIC METABOLIC PNL TOTAL CA: CPT | Performed by: INTERNAL MEDICINE

## 2023-06-15 PROCEDURE — 85027 COMPLETE CBC AUTOMATED: CPT | Performed by: INTERNAL MEDICINE

## 2023-06-15 RX ADMIN — AMLODIPINE BESYLATE 2.5 MG: 2.5 TABLET ORAL at 09:47

## 2023-06-15 RX ADMIN — FAMOTIDINE 20 MG: 20 TABLET ORAL at 09:46

## 2023-06-15 RX ADMIN — HYDROXYZINE HYDROCHLORIDE 25 MG: 25 TABLET, FILM COATED ORAL at 09:46

## 2023-06-15 RX ADMIN — ACETAMINOPHEN 650 MG: 325 TABLET ORAL at 05:34

## 2023-06-15 RX ADMIN — LORATADINE 10 MG: 10 TABLET ORAL at 09:47

## 2023-06-15 RX ADMIN — HYDROCHLOROTHIAZIDE 25 MG: 25 TABLET ORAL at 09:47

## 2023-06-15 RX ADMIN — FAMOTIDINE 20 MG: 20 TABLET ORAL at 18:36

## 2023-06-15 RX ADMIN — POTASSIUM CHLORIDE 40 MEQ: 1500 TABLET, EXTENDED RELEASE ORAL at 00:09

## 2023-06-15 RX ADMIN — HYDROXYZINE HYDROCHLORIDE 25 MG: 25 TABLET, FILM COATED ORAL at 18:36

## 2023-06-15 RX ADMIN — METOPROLOL SUCCINATE 50 MG: 50 TABLET, EXTENDED RELEASE ORAL at 09:46

## 2023-06-15 RX ADMIN — PANTOPRAZOLE SODIUM 20 MG: 20 TABLET, DELAYED RELEASE ORAL at 05:34

## 2023-06-15 RX ADMIN — METHYLPREDNISOLONE SODIUM SUCCINATE 60 MG: 125 INJECTION, POWDER, FOR SOLUTION INTRAMUSCULAR; INTRAVENOUS at 09:47

## 2023-06-15 NOTE — ASSESSMENT & PLAN NOTE
Reports eating crab salad, symptoms initially were lip swelling    Did use epinephrine pen  Recommend avoidance of all shellfish

## 2023-06-15 NOTE — UTILIZATION REVIEW
Initial Clinical Review    Admission: Date/Time/Statement:   Admission Orders (From admission, onward)     Ordered        06/14/23 2146  Place in Observation  Once                      Orders Placed This Encounter   Procedures   • Place in Observation     Standing Status:   Standing     Number of Occurrences:   1     Order Specific Question:   Level of Care     Answer:   Med Surg [16]     Order Specific Question:   Bed Type     Answer:   Boo [4]     ED Arrival Information     Expected   -    Arrival   6/14/2023 18:55    Acuity   Emergent            Means of arrival   Walk-In    Escorted by   Family Member    Service   Hospitalist    Admission type   Emergency            Arrival complaint   allergic reaction           Chief Complaint   Patient presents with   • Allergic Reaction     Pt states she was seen here yesterday for allergic reaction  Pt presenting with hives, swelling, itching, denies SOB, but feels as if her throat is itchy  Initial Presentation: 39 y o  female presents to ED from home with itching on lips and chronic rash  Has allergy to shrimp, ate a seafood salad the day prior to admission  Lips swelled,  No wheezing  Seen in ED and symptoms improved  Now returns to ED with concern for worsening allergic reaction  PMH is chronic urticaria  Has been on   H2 blockers and Hi  Blockers  Has been controlled recently, has intermittent rash  Feels  Gums are swollen  Labs  Show low potassium  Admit  Observation with chronic urticaria, hypokalemia and  Allergic reaction and plan is  Monitor labs, replace electrolytes, no indication for steroids  And continue home meds          ED Triage Vitals   Temperature Pulse Respirations Blood Pressure SpO2   06/14/23 2237 06/14/23 1902 06/14/23 1902 06/14/23 1902 06/14/23 1902   98 5 °F (36 9 °C) 96 20 131/71 99 %      Temp Source Heart Rate Source Patient Position - Orthostatic VS BP Location FiO2 (%)   06/14/23 2237 06/14/23 1902 06/14/23 1902 06/14/23 1902 --   Oral Monitor Lying Right arm       Pain Score       06/14/23 2320       No Pain          Wt Readings from Last 1 Encounters:   06/15/23 62 7 kg (138 lb 3 7 oz)     Additional Vital Signs:   98 1 °F (36 7 °C) 86 17 136/72 93 100 % None (Room air) Sitting    06/14/23 23:20:52 97 9 °F (36 6 °C) 99 18 119/71 87 97 % None (Room air) --   06/14/23 2237 98 5 °F (36 9 °C) -- -- -- -- -- -- --   06/14/23 2223 -- 94 18 125/79 -- 99 % None (Room air) Lying   06/14/23 2030 -- 100 18 136/75 -- 99 % None (Room air) Lying   06/14/23 1902 -- 96 20 131/71 -- 99 % None (Room air) Lying       Pertinent Labs/Diagnostic Test Results:         Results from last 7 days   Lab Units 06/15/23  0534 06/14/23  1931   HEMATOCRIT % 39 2 38 1   HEMOGLOBIN g/dL 12 7 12 6   NEUTROS ABS Thousands/µL  --  5 77   PLATELETS Thousands/uL 340 339   WBC Thousand/uL 4 51 8 80         Results from last 7 days   Lab Units 06/15/23  0534 06/14/23  1931   ANION GAP mmol/L 5 10   BUN mg/dL 10 7   CALCIUM mg/dL 9 1 9 1   CHLORIDE mmol/L 109* 103   CO2 mmol/L 25 23   CREATININE mg/dL 0 30* 0 24*   EGFR ml/min/1 73sq m 139 150   POTASSIUM mmol/L 4 4 3 1*   SODIUM mmol/L 139 136             Results from last 7 days   Lab Units 06/15/23  0534 06/14/23  1931   GLUCOSE RANDOM mg/dL 134 97                         ED Treatment:   Medication Administration from 06/14/2023 1855 to 06/14/2023 2314       Date/Time Order Dose Route Action Comments     06/14/2023 1922 EDT methylPREDNISolone sodium succinate (Solu-MEDROL) injection 125 mg 125 mg Intravenous Given --     06/14/2023 1919 EDT EPINEPHrine PF (ADRENALIN) 1 mg/mL injection 0 15 mg 0 15 mg Intramuscular Given --     06/14/2023 1923 EDT Famotidine (PF) (PEPCID) injection 20 mg 20 mg Intravenous Given --     06/14/2023 1954 EDT loratadine (CLARITIN) tablet 10 mg 10 mg Oral Given --     06/14/2023 2221 EDT hydrOXYzine HCL (ATARAX) tablet 25 mg 25 mg Oral Given --          Admitting Diagnosis: Hypokalemia [E87 6]  Rash [R21]  Allergic reaction [T78 40XA]  Acute allergic reaction, initial encounter [T78 40XA]  Age/Sex: 39 y o  female  Admission Orders:  Scheduled Medications:  amLODIPine, 2 5 mg, Oral, Daily  famotidine, 20 mg, Oral, BID  hydrochlorothiazide, 25 mg, Oral, Daily  hydrOXYzine HCL, 25 mg, Oral, BID  loratadine, 10 mg, Oral, Daily  methylPREDNISolone sodium succinate, 60 mg, Intravenous, Daily  metoprolol succinate, 50 mg, Oral, Daily  pantoprazole, 20 mg, Oral, Early Morning      Continuous IV Infusions:     PRN Meds:  acetaminophen, 650 mg, Oral, Q6H PRN  albuterol, 2 puff, Inhalation, Q4H PRN  ondansetron, 4 mg, Intravenous, Q6H PRN      Network Utilization Review Department  ATTENTION: Please call with any questions or concerns to [phone] and carefully listen to the prompts so that you are directed to the right person  All voicemails are confidential   Cortney Acosta all requests for admission clinical reviews, approved or denied determinations and any other requests to dedicated fax number below belonging to the campus where the patient is receiving treatment   List of dedicated fax numbers for the Facilities:  [address] DENIALS (Administrative/Medical Necessity) [phone]   [address] (Maternity/NICU/Pediatrics) [phone]   5 Maryam Reich [phone]   [facility] [address] [phone]   [address] [facility] [address] [place] [address] [place] [address] [phone]   [employer] Eboni Mcfarland [facility] 134 5 [facility] [phone]

## 2023-06-15 NOTE — ASSESSMENT & PLAN NOTE
Patient with history of chronic urticaria    Recently seen and treated at [facility] 2 weeks prior  Currently improving, continue Pepcid, Atarax and IV steroids  Unclear of etiology, suspect chronic idiopathic urticaria  Symptoms appear to be improving, possible discharge home tomorrow with steroid taper  Recommend follow-up outpatient with allergist

## 2023-06-15 NOTE — ED PROVIDER NOTES
History  Chief Complaint   Patient presents with   • Allergic Reaction     Pt states she was seen here yesterday for allergic reaction  Pt presenting with hives, swelling, itching, denies SOB, but feels as if her throat is itchy  The patient is a 39 YOF with hx anemia, HTN who presents to the ED for evaluation of urticaria, lip swelling, and the sensation of her throat closing that began just prior to arrival  She was seen in the ED yesterday for same  Prior to her symptom onset yesterday, she ate crab  She has a known shrimp allergy  At that time, she was given Solu-Medrol, Epinephrine, Pepcid, and benadryl with improvement in symptoms  She states that today, she took her Pepcid and Prednisone when her symptoms suddenly worsened  Since discharge yesterday, she reports her symptoms had improved  She otherwise denies fever, chest pain, dyspnea, nausea, vomiting  She does note history of benadryl allergy but had no reaction yesterday  She was discharged with Rx for EpiPen but did not use it today  HPI was obtained using  services  Prior to Admission Medications   Prescriptions Last Dose Informant Patient Reported? Taking?    Cholecalciferol (VITAMIN D PO) Not Taking Self Yes No   Sig: Take by mouth   Patient not taking: Reported on 6/13/2023   EPINEPHrine (EPIPEN) 0 3 mg/0 3 mL SOAJ   No Yes   Sig: Inject 0 3 mL (0 3 mg total) into a muscle once for 1 dose   albuterol (ProAir HFA) 90 mcg/act inhaler   No Yes   Sig: Inhale 2 puffs every 4 (four) hours as needed for shortness of breath   amLODIPine (NORVASC) 10 mg tablet   Yes Yes   amLODIPine (NORVASC) 2 5 mg tablet   Yes Yes   Sig: Take 2 5 mg by mouth daily   azithromycin (ZITHROMAX) 250 mg tablet Not Taking  Yes No   Patient not taking: Reported on 6/13/2023   famotidine (PEPCID) 20 mg tablet   No Yes   Sig: Take 1 tablet (20 mg total) by mouth 2 (two) times a day   hydrochlorothiazide (HYDRODIURIL) 25 mg tablet   Yes Yes   Sig: Take 25 mg by mouth daily   ibuprofen (MOTRIN) 600 mg tablet   Yes Yes   Sig: Take 600 mg by mouth every 6 (six) hours as needed   lidocaine (LIDODERM) 5 % Not Taking  No No   Sig: Apply 1 patch topically daily Remove & Discard patch within 12 hours or as directed by MD   Patient not taking: Reported on 2/25/2020   methocarbamol (ROBAXIN) 500 mg tablet Not Taking  No No   Sig: Take 1 tablet (500 mg total) by mouth 2 (two) times a day   Patient not taking: Reported on 6/13/2023   methocarbamol (ROBAXIN) 500 mg tablet Not Taking  Yes No   Patient not taking: Reported on 6/13/2023   metoprolol succinate (TOPROL-XL) 50 mg 24 hr tablet   Yes Yes   Sig: Take 50 mg by mouth daily   metoprolol succinate (TOPROL-XL) 50 mg 24 hr tablet   Yes Yes   multivitamin (THERAGRAN) TABS Not Taking Self Yes No   Sig: Take 1 tablet by mouth daily   Patient not taking: Reported on 6/13/2023   omeprazole (PriLOSEC) 20 mg delayed release capsule   Yes Yes   Sig: Take 20 mg by mouth daily   predniSONE 20 mg tablet   No Yes   Sig: Take 2 tablets (40 mg total) by mouth daily for 5 days      Facility-Administered Medications: None       Past Medical History:   Diagnosis Date   • Allergic    • Anemia    • HL (hearing loss)    • Hypertension        History reviewed  No pertinent surgical history  Family History   Problem Relation Age of Onset   • No Known Problems Mother      I have reviewed and agree with the history as documented  E-Cigarette/Vaping     E-Cigarette/Vaping Substances     Social History     Tobacco Use   • Smoking status: Never   • Smokeless tobacco: Never   Substance Use Topics   • Alcohol use: Never   • Drug use: Never       Review of Systems   Constitutional: Negative for chills and fever  HENT: Positive for sore throat, trouble swallowing and voice change  Negative for congestion, drooling and rhinorrhea  Respiratory: Negative for cough, shortness of breath and stridor      Cardiovascular: Negative for chest pain and leg swelling  Gastrointestinal: Negative for abdominal pain, constipation, diarrhea, nausea and vomiting  Genitourinary: Negative for dysuria and flank pain  Musculoskeletal: Negative for arthralgias and myalgias  Skin: Positive for rash  Negative for wound  Neurological: Negative for dizziness, weakness, numbness and headaches  Psychiatric/Behavioral: Negative for behavioral problems  Physical Exam  Physical Exam  Vitals and nursing note reviewed  Constitutional:       General: She is not in acute distress  Appearance: She is well-developed  HENT:      Head: Normocephalic and atraumatic  Mouth/Throat:      Mouth: Mucous membranes are moist       Pharynx: Oropharynx is clear  Uvula midline  No uvula swelling  Comments: Left lip swelling however no uvular edema, no brawniness under the tongue  Airway patent  Patient tolerating oral secretions  Eyes:      Conjunctiva/sclera: Conjunctivae normal    Cardiovascular:      Rate and Rhythm: Normal rate and regular rhythm  Heart sounds: No murmur heard  Pulmonary:      Effort: Pulmonary effort is normal  No respiratory distress  Breath sounds: Normal breath sounds  Abdominal:      Palpations: Abdomen is soft  Tenderness: There is no abdominal tenderness  There is no guarding or rebound  Musculoskeletal:         General: No swelling  Cervical back: Neck supple  Right lower leg: No edema  Left lower leg: No edema  Skin:     General: Skin is warm and dry  Capillary Refill: Capillary refill takes less than 2 seconds  Findings: Rash present  Rash is urticarial       Comments: Diffuse erythematous maculopapular rash    Neurological:      Mental Status: She is alert     Psychiatric:         Mood and Affect: Mood normal          Vital Signs  ED Triage Vitals [06/14/23 1902]   Temp Pulse Respirations Blood Pressure SpO2   -- 96 20 131/71 99 %      Temp src Heart Rate Source Patient Position - Orthostatic VS BP Location FiO2 (%)   -- Monitor Lying Right arm --      Pain Score       --           Vitals:    06/14/23 1902 06/14/23 2030   BP: 131/71 136/75   Pulse: 96 100   Patient Position - Orthostatic VS: Lying Lying         Visual Acuity      ED Medications  Medications   loratadine (CLARITIN) tablet 10 mg (10 mg Oral Given 6/14/23 1954)   hydrOXYzine HCL (ATARAX) tablet 25 mg (25 mg Oral Given 6/14/23 2221)   methylPREDNISolone sodium succinate (Solu-MEDROL) injection 60 mg (has no administration in time range)   methylPREDNISolone sodium succinate (Solu-MEDROL) injection 125 mg (125 mg Intravenous Given 6/14/23 1922)   EPINEPHrine PF (ADRENALIN) 1 mg/mL injection 0 15 mg (0 15 mg Intramuscular Given 6/14/23 1919)   Famotidine (PF) (PEPCID) injection 20 mg (20 mg Intravenous Given 6/14/23 1923)       Diagnostic Studies  Results Reviewed     Procedure Component Value Units Date/Time    Basic metabolic panel [[id number]]  (Abnormal) Collected: 06/14/23 1931    Lab Status: Final result Specimen: Blood from Arm, Right Updated: 06/14/23 1957     Sodium 136 mmol/L      Potassium 3 1 mmol/L      Chloride 103 mmol/L      CO2 23 mmol/L      ANION GAP 10 mmol/L      BUN 7 mg/dL      Creatinine 0 24 mg/dL      Glucose 97 mg/dL      Calcium 9 1 mg/dL      eGFR 150 ml/min/1 73sq m     Narrative:      [place] guidelines for Chronic Kidney Disease (CKD):   •  Stage 1 with normal or high GFR (GFR > 90 mL/min/1 73 square meters)  •  Stage 2 Mild CKD (GFR = 60-89 mL/min/1 73 square meters)  •  Stage 3A Moderate CKD (GFR = 45-59 mL/min/1 73 square meters)  •  Stage 3B Moderate CKD (GFR = 30-44 mL/min/1 73 square meters)  •  Stage 4 Severe CKD (GFR = 15-29 mL/min/1 73 square meters)  •  Stage 5 End Stage CKD (GFR <15 mL/min/1 73 square meters)  Note: GFR calculation is accurate only with a steady state creatinine    CBC and differential [[id number]]  (Abnormal) Collected: 06/14/23 1931    Lab Status: Final result Specimen: Blood from Arm, Right Updated: 06/14/23 1937     WBC 8 80 Thousand/uL      RBC 4 76 Million/uL      Hemoglobin 12 6 g/dL      Hematocrit 38 1 %      MCV 80 fL      MCH 26 5 pg      MCHC 33 1 g/dL      RDW 12 8 %      MPV 10 3 fL      Platelets 112 Thousands/uL      nRBC 0 /100 WBCs      Neutrophils Relative 66 %      Immat GRANS % 0 %      Lymphocytes Relative 24 %      Monocytes Relative 10 %      Eosinophils Relative 0 %      Basophils Relative 0 %      Neutrophils Absolute 5 77 Thousands/µL      Immature Grans Absolute 0 03 Thousand/uL      Lymphocytes Absolute 2 12 Thousands/µL      Monocytes Absolute 0 85 Thousand/µL      Eosinophils Absolute 0 01 Thousand/µL      Basophils Absolute 0 02 Thousands/µL                  No orders to display              Procedures  Procedures         ED Course  ED Course as of 06/14/23 2222 Wed Jun 14, 2023 1959 Absolute Eosinophils: 0 01         SBIRT 20yo+    Flowsheet Row Most Recent Value   Initial Alcohol Screen: US AUDIT-C     1  How often do you have a drink containing alcohol? 0 Filed at: 06/14/2023 1903   2  How many drinks containing alcohol do you have on a typical day you are drinking? 0 Filed at: 06/14/2023 1903   3b  FEMALE Any Age, or MALE 65+: How often do you have 4 or more drinks on one occassion? 0 Filed at: 06/14/2023 1903   Audit-C Score 0 Filed at: 06/14/2023 1903   DOMONIQUE: How many times in the past year have you    Used an illegal drug or used a prescription medication for non-medical reasons? Never Filed at: 06/14/2023 1903        Medical Decision Making  Patient is a 50-year-old female presents the ED for evaluation of allergic reaction  Known shellfish allergy, likely allergic reaction versus anaphylaxis  Doubt hereditary angioedema and scombroid due to history and physical exam   Will treat symptomatically with epinephrine, steroids, Pepcid   Patient raises concern for Benadryl allergy; will give Claritin     On re-examination, patient with resolution of rash and symptoms  She is in no acute distress and reports improvement  Mild hypokalemia, labs otherwise unremarkable  Will admit given return of symptoms    At the time of admission, the patient is in no acute distress  I discussed with the patient and family the diagnosis, treatment plan, and plan for admission; they were given the opportunity to ask questions and verbalized understanding  They agree with plan  Acute allergic reaction, initial encounter: acute illness or injury  Hypokalemia: acute illness or injury  Rash: acute illness or injury  Amount and/or Complexity of Data Reviewed  External Data Reviewed: labs and notes  Labs: ordered  Decision-making details documented in ED Course  Risk  OTC drugs  Prescription drug management  Disposition  Final diagnoses:   Acute allergic reaction, initial encounter   Rash   Hypokalemia     Time reflects when diagnosis was documented in both MDM as applicable and the Disposition within this note     Time User Action Codes Description Comment    6/14/2023 10:02 PM Dedrick Tavares Add [T78 40XA] Acute allergic reaction, initial encounter     6/14/2023 10:02 PM Britany Tavares Add [R21] Rash     6/14/2023 10:22 PM Kinga Hicks Add [E87 6] Hypokalemia       ED Disposition     ED Disposition   Admit    Condition   Stable    Date/Time   Wed Jun 14, 2023 10:22 PM    Comment   Case was discussed with YOHANA and the patient's admission status was agreed to be Admission Status: observation status to the service of Dr Bonilla Praham   Follow-up Information    None         Patient's Medications   Discharge Prescriptions    No medications on file       No discharge procedures on file      PDMP Review     None          ED Provider  Electronically Signed by           Maine Nj PA-C  06/14/23 1299

## 2023-06-15 NOTE — PROGRESS NOTES
[address] [place] [address]  Progress Note  Name: Kiara De La Cruz  MRN: [id number]  Unit/Bed#: E5 -16 I Date of Admission: 2023   Date of Service: 6/15/2023 I Hospital Day: 0    Assessment/Plan   Hypokalemia  Assessment & Plan  Repleted, check magnesium    Allergic reaction  Assessment & Plan  Reports eating crab salad, symptoms initially were lip swelling  Did use epinephrine pen  Recommend avoidance of all shellfish    HTN (hypertension)  Assessment & Plan  Home medication metoprolol, hydrochlorothiazide and amlodipine    * Chronic urticaria  Assessment & Plan  Patient with history of chronic urticaria  Recently seen and treated at [facility] 2 weeks prior  Currently improving, continue Pepcid, Atarax and IV steroids  Unclear of etiology, suspect chronic idiopathic urticaria  Symptoms appear to be improving, possible discharge home tomorrow with steroid taper  Recommend follow-up outpatient with allergist           VTE Pharmacologic Prophylaxis:   Pharmacologic: none, low risk  Mechanical VTE Prophylaxis in Place: Yes    Patient Centered Rounds: I have performed bedside rounds with nursing staff today  Current Length of Stay: 0 day(s)    Current Patient Status: Observation   Certification Statement: The patient will continue to require additional inpatient hospital stay due to IV steroids    Discharge Plan: tmr    Code Status: Level 1 - Full Code      Subjective:   No events overnight  Since hospitalization, diffuse rash appears improving, patient did show imaging on cell phone  Continues to have pruritus  Denies fevers, chills, nausea or vomiting  Objective:     Vitals:   Temp (24hrs), Av 2 °F (36 8 °C), Min:97 9 °F (36 6 °C), Max:98 5 °F (36 9 °C)    Temp:  [97 9 °F (36 6 °C)-98 5 °F (36 9 °C)] 98 1 °F (36 7 °C)  HR:  [] 89  Resp:  [17-20] 17  BP: (119-139)/(71-84) 139/84  SpO2:  [97 %-100 %] 99 %  Body mass index is 27 92 kg/m²       Input and Output Summary (last 24 hours):     No intake or output data in the 24 hours ending 06/15/23 1522    Physical Exam:     Physical Exam  Vitals and nursing note reviewed  Constitutional:       Appearance: Normal appearance  HENT:      Head: Normocephalic and atraumatic  Eyes:      General: No scleral icterus  Conjunctiva/sclera: Conjunctivae normal    Cardiovascular:      Rate and Rhythm: Normal rate and regular rhythm  Pulmonary:      Breath sounds: No wheezing or rhonchi  Abdominal:      General: Bowel sounds are normal  There is no distension  Palpations: Abdomen is soft  Musculoskeletal:         General: No swelling  Right lower leg: No edema  Skin:     General: Skin is warm and dry  Neurological:      General: No focal deficit present  Mental Status: She is alert  Mental status is at baseline  Additional Data:     Labs:    Results from last 7 days   Lab Units 06/15/23  0534 06/14/23  1931   EOS PCT %  --  0   HEMATOCRIT % 39 2 38 1   HEMOGLOBIN g/dL 12 7 12 6   LYMPHS PCT %  --  24   MONOS PCT %  --  10   NEUTROS PCT %  --  66   PLATELETS Thousands/uL 340 339   WBC Thousand/uL 4 51 8 80     Results from last 7 days   Lab Units 06/15/23  0534   ANION GAP mmol/L 5   BUN mg/dL 10   CALCIUM mg/dL 9 1   CHLORIDE mmol/L 109*   CO2 mmol/L 25   CREATININE mg/dL 0 30*   GLUCOSE RANDOM mg/dL 134   POTASSIUM mmol/L 4 4   SODIUM mmol/L 139                           * I Have Reviewed All Lab Data Listed Above  * Additional Pertinent Lab Tests Reviewed: [facility] Ad[facility] Reviewed    Imaging:    No results found        Recent Cultures (last 7 days):           Last 24 Hours Medication List:   Current Facility-Administered Medications   Medication Dose Route Frequency Provider Last Rate   • acetaminophen  650 mg Oral Q6H PRN James Conway,      • albuterol  2 puff Inhalation Q4H PRN James Conway, DO     • amLODIPine  2 5 mg Oral Daily Dmitri Webster, DO     • famotidine 20 mg Oral BID Connee Solo, DO     • hydrochlorothiazide  25 mg Oral Daily Dmitri Felicie Nena, DO     • hydrOXYzine HCL  25 mg Oral BID Connee Solo, DO     • loratadine  10 mg Oral Daily Dmitri Felicie Nena, DO     • methylPREDNISolone sodium succinate  60 mg Intravenous Daily Dmitri Felicie Nena, DO     • metoprolol succinate  50 mg Oral Daily Dmitri Felicie Nena, DO     • ondansetron  4 mg Intravenous Q6H PRN Verenice Haile, DO     • pantoprazole  20 mg Oral Early Morning Dmitri Barrett, DO          Today, Patient Was Seen By: Wynona Denver, MD    ** Please Note: Dictation voice to text software may have been used in the creation of this document   **

## 2023-06-15 NOTE — ASSESSMENT & PLAN NOTE
Patient with history of chronic urticaria, sure disease is usually tested by rash  Has been on H2 blocker as well as H1 blocker  Had previous success with antihistamines in the past   C1 esterase normal limits, recommend outpatient allergy referral  Upon length of symptoms I suspect the patient likely had single episode of allergic reaction but her symptoms are more related to her chronic idiopathic urticaria    Recommend  Home regimen would be the following:  Allegra 180 mg twice a day, Atarax 25 mg every 6 hours as needed -this may be sedating she will she may want to limit use throughout the day    Reported previous itching to Benadryl  Continue Pepcid 20 mg twice a day  No indication for steroids, could consider short taper the patient is having some relief  She can follow-up outpatient with allergy to discuss immunotherapy

## 2023-06-15 NOTE — H&P
[address] [place] [address]  H&P  Name: Mariel Villanueva 39 y o  female I MRN: [id number]  Unit/Bed#: E5 -01 I Date of Admission: 6/14/2023   Date of Service: 6/14/2023 I Hospital Day: 0    Assessment and Plan  * Chronic urticaria  Assessment & Plan  Patient with history of chronic urticaria, sure disease is usually tested by rash  Has been on H2 blocker as well as H1 blocker  Had previous success with antihistamines in the past   C1 esterase normal limits, recommend outpatient allergy referral  Upon length of symptoms I suspect the patient likely had single episode of allergic reaction but her symptoms are more related to her chronic idiopathic urticaria    Recommend  Home regimen would be the following:  Allegra 180 mg twice a day, Atarax 25 mg every 6 hours as needed -this may be sedating she will she may want to limit use throughout the day  Reported previous itching to Benadryl  Continue Pepcid 20 mg twice a day  No indication for steroids, could consider short taper the patient is having some relief  She can follow-up outpatient with allergy to discuss immunotherapy    Hypokalemia  Assessment & Plan  Please secondary to nebulizer treatment given in the emergency  We will replete potassium orally    Allergic reaction  Assessment & Plan  Reports eating crab salad, symptoms initially were lip swelling  Did use epinephrine pen  Recommend avoidance of all shellfish, so she may have bimodal allergic reaction    HTN (hypertension)  Assessment & Plan  Home medication metoprolol, hydrochlorothiazide and amlodipine      Code Status: Full code    VTE Prophylaxis: Early ambulation  / sequential compression device     POLST: There is no POLST form on file for this patient (pre-hospital)  Discussion with family: Discussed with  at bedside    Anticipated Length of Stay:  Patient will be admitted on an Observation basis with an anticipated length of stay of less than 2 midnights  Justification for Hospital Stay: Chronic urticaria     Total Time for Visit, including Counseling / Coordination of Care: 1 hour  Greater than 50% of this total time spent on direct patient counseling and coordination of care  Chief Complaint:     Chief Complaint   Patient presents with   • Allergic Reaction     Pt states she was seen here yesterday for allergic reaction  Pt presenting with hives, swelling, itching, denies SOB, but feels as if her throat is itchy  History of Present Illness:    Reji Traylor is a 39 y o  female who presents with itching of her lips as well as, chronic rash  Patient reports she ate a crab salad yesterday despite knowing she had a shrimp allergy  Initially she had lip swelling, she had no wheezing  She was evaluated in the emergency room and her symptoms improved  She now returns back to the hospital with concern for worsening allergic reaction  Her past medical history is notable for chronic urticaria  She has previously been on H2 blockers as well as H1 blockers  He is followed in the outpatient setting  Does of been relatively well controlled although she does have intermittent rash  She denies any other recent travel or trip history, no other food ingestion  She reports that she feels her gums are swelling  She has no shortness of breath, no chest pain no wheezing, no diarrhea    Review of Systems:    A complete and comprehensive 14 point organ system review was performed and all other systems are negative other than stated above in the HPI    Past Medical and Surgical History:     Past Medical History:   Diagnosis Date   • Allergic    • Anemia    • HL (hearing loss)    • Hypertension        History reviewed  No pertinent surgical history  Meds/Allergies:    Prior to Admission medications    Medication Sig Start Date End Date Taking?  Authorizing Provider   albuterol (ProAir HFA) 90 mcg/act inhaler Inhale 2 puffs every 4 (four) hours as needed for shortness of breath 4/23/22  Yes Kingsley Mcardle, MD   amLODIPine (NORVASC) 10 mg tablet  2/18/20  Yes Historical Provider, MD   amLODIPine (NORVASC) 2 5 mg tablet Take 2 5 mg by mouth daily   Yes Historical Provider, MD   EPINEPHrine (EPIPEN) 0 3 mg/0 3 mL SOAJ Inject 0 3 mL (0 3 mg total) into a muscle once for 1 dose 6/13/23 6/14/23 Yes Nirav Leija DO   famotidine (PEPCID) 20 mg tablet Take 1 tablet (20 mg total) by mouth 2 (two) times a day 6/13/23  Yes Nirav Leija DO   hydrochlorothiazide (HYDRODIURIL) 25 mg tablet Take 25 mg by mouth daily 8/30/19 6/14/23 Yes Historical Provider, MD   ibuprofen (MOTRIN) 600 mg tablet Take 600 mg by mouth every 6 (six) hours as needed 8/8/22 8/8/23 Yes Historical Provider, MD   metoprolol succinate (TOPROL-XL) 50 mg 24 hr tablet Take 50 mg by mouth daily   Yes Historical Provider, MD   metoprolol succinate (TOPROL-XL) 50 mg 24 hr tablet  1/30/20  Yes Historical Provider, MD   omeprazole (PriLOSEC) 20 mg delayed release capsule Take 20 mg by mouth daily 2/14/22 6/14/23 Yes Historical Provider, MD   predniSONE 20 mg tablet Take 2 tablets (40 mg total) by mouth daily for 5 days 6/13/23 6/18/23 Yes Nirav Leija DO   azithromycin [facility]) 250 mg tablet  4/23/22   Historical Provider, MD   Cholecalciferol (VITAMIN D PO) Take by mouth  Patient not taking: Reported on 6/13/2023    Historical Provider, MD   lidocaine (LIDODERM) 5 % Apply 1 patch topically daily Remove & Discard patch within 12 hours or as directed by MD  Patient not taking: Reported on 2/25/2020 2/4/20   Balbina Wang MD   methocarbamol (ROBAXIN) 500 mg tablet Take 1 tablet (500 mg total) by mouth 2 (two) times a day  Patient not taking: Reported on 6/13/2023 2/4/20   Balbina Wang MD   methocarbamol (ROBAXIN) 500 mg tablet  2/4/20   Historical Provider, MD   multivitamin [facility]) TABS Take 1 tablet by mouth daily  Patient not taking: Reported on 6/13/2023 9/21/17 "Historical Provider, MD     I have reviewed home medications with patient personally  Allergies: Allergies   Allergen Reactions   • Lisinopril Swelling and Angioedema   • Nortriptyline Hives     RASH   • Diphenhydramine Itching       Social History:     Marital Status: Single   Occupation: Unknown    Substance Use History:   Social History     Substance and Sexual Activity   Alcohol Use Never     Social History     Tobacco Use   Smoking Status Never   Smokeless Tobacco Never     Social History     Substance and Sexual Activity   Drug Use Never       Family History:    Family History   Problem Relation Age of Onset   • No Known Problems Mother        Physical Exam:     Vitals:   Blood Pressure: 125/79 (06/14/23 2223)  Pulse: 94 (06/14/23 2223)  Temperature: 98 5 °F (36 9 °C) (06/14/23 2237)  Temp Source: Oral (06/14/23 2237)  Respirations: 18 (06/14/23 2223)  Height: 4' 11\" (149 9 cm) (06/14/23 1902)  Weight - Scale: 62 1 kg (136 lb 14 5 oz) (06/14/23 1902)  SpO2: 99 % (06/14/23 2223)      General: well appearing, no acute distress  HEENT: atraumatic, PERRLA, moist mucosa, normal pharynx, normal tonsils and adenoids, normal tongue, no fluid in sinuses  Neck: Trachea midline, no carotid bruit, no masses  Respiratory: normal chest wall expansion, CTA B, no r/r/w, no rubs  Cardiovascular: RRR, no m/r/g, Normal S1 and S2  Abdomen: Soft, non-tender, non-distended, normal bowel sounds in all quadrants, no hepatosplenomegaly, no tympany  Rectal: deferred  Musculoskeletal: normal ROM in upper and lower extremities  Integumentary: Urticarial rash  Heme/Lymph: no lymphadenopathy, no bruises  Neurological: Cranial Nerves II-XII grossly intact  Psychiatric: cooperative with normal mood, affect, and cognition    Additional Data:     Lab Results: I have personally reviewed pertinent reports        Results from last 7 days   Lab Units 06/14/23  1931   EOS PCT % 0   HEMATOCRIT % 38 1   HEMOGLOBIN g/dL 12 6   LYMPHS PCT % 24 " MONOS PCT % 10   NEUTROS PCT % 66   PLATELETS Thousands/uL 339   WBC Thousand/uL 8 80     Results from last 7 days   Lab Units 06/14/23  1931   ANION GAP mmol/L 10   BUN mg/dL 7   CALCIUM mg/dL 9 1   CHLORIDE mmol/L 103   CO2 mmol/L 23   CREATININE mg/dL 0 24*   GLUCOSE RANDOM mg/dL 97   POTASSIUM mmol/L 3 1*   SODIUM mmol/L 136                           Imaging: I have personally reviewed pertinent reports  No orders to display       EKG, Pathology, and Other Studies Reviewed on Admission:   · Reviewed previous discharge record    [employer] / [employer] Reviewed: Yes     ** Please Note: This note was completed in part utilizing M-Modal Fluency Direct Software  Grammatical errors, random word insertions, spelling mistakes, and incomplete sentences may be an occasional consequence of this system secondary to software limitations, ambient noise, and hardware issues  If you have any questions or concerns about the content, text, or information contained within the body of this dictation, please contact the provider for clarification  **

## 2023-06-15 NOTE — ASSESSMENT & PLAN NOTE
Reports eating crab salad, symptoms initially were lip swelling    Did use epinephrine pen  Recommend avoidance of all shellfish, so she may have bimodal allergic reaction

## 2023-06-15 NOTE — PLAN OF CARE
Problem: Potential for Falls  Goal: Patient will remain free of falls  Description: INTERVENTIONS:  - Educate patient/family on patient safety including physical limitations  - Instruct patient to call for assistance with activity   - Consult OT/PT to assist with strengthening/mobility   - Keep Call bell within reach  - Keep bed low and locked with side rails adjusted as appropriate  - Keep care items and personal belongings within reach  - Initiate and maintain comfort rounds  - Make Fall Risk Sign visible to staff  - Apply yellow socks and bracelet for high fall risk patients  - Consider moving patient to room near nurses station  Outcome: Progressing     Problem: PAIN - ADULT  Goal: Verbalizes/displays adequate comfort level or baseline comfort level  Description: Interventions:  - Encourage patient to monitor pain and request assistance  - Assess pain using appropriate pain scale  - Administer analgesics based on type and severity of pain and evaluate response  - Implement non-pharmacological measures as appropriate and evaluate response  - Consider cultural and social influences on pain and pain management  - Notify physician/advanced practitioner if interventions unsuccessful or patient reports new pain  Outcome: Progressing     Problem: INFECTION - ADULT  Goal: Absence or prevention of progression during hospitalization  Description: INTERVENTIONS:  - Assess and monitor for signs and symptoms of infection  - Monitor lab/diagnostic results  - Monitor all insertion sites, i e  indwelling lines, tubes, and drains  - Monitor endotracheal if appropriate and nasal secretions for changes in amount and color  - [place] appropriate cooling/warming therapies per order  - Administer medications as ordered  - Instruct and encourage patient and family to use good hand hygiene technique  - Identify and instruct in appropriate isolation precautions for identified infection/condition  Outcome: Progressing  Goal: Absence of fever/infection during neutropenic period  Description: INTERVENTIONS:  - Monitor WBC    Outcome: Progressing     Problem: SAFETY ADULT  Goal: Patient will remain free of falls  Description: INTERVENTIONS:  - Educate patient/family on patient safety including physical limitations  - Instruct patient to call for assistance with activity   - Consult OT/PT to assist with strengthening/mobility   - Keep Call bell within reach  - Keep bed low and locked with side rails adjusted as appropriate  - Keep care items and personal belongings within reach  - Initiate and maintain comfort rounds  - Make Fall Risk Sign visible to staff  - Apply yellow socks and bracelet for high fall risk patients  - Consider moving patient to room near nurses station  Outcome: Progressing  Goal: Maintain or return to baseline ADL function  Description: INTERVENTIONS:  -  Assess patient's ability to carry out ADLs; assess patient's baseline for ADL function and identify physical deficits which impact ability to perform ADLs (bathing, care of mouth/teeth, toileting, grooming, dressing, etc )  - Assess/evaluate cause of self-care deficits   - Assess range of motion  - Assess patient's mobility; develop plan if impaired  - Assess patient's need for assistive devices and provide as appropriate  - Encourage maximum independence but intervene and supervise when necessary  - Involve family in performance of ADLs  - Assess for home care needs following discharge   - Consider OT consult to assist with ADL evaluation and planning for discharge  - Provide patient education as appropriate  Outcome: Progressing  Goal: Maintains/Returns to pre admission functional level  Description: INTERVENTIONS:  - Perform BMAT or MOVE assessment daily    - Set and communicate daily mobility goal to care team and patient/family/caregiver     - Collaborate with rehabilitation services on mobility goals if consulted  - Out of bed for toileting  - Record patient progress and toleration of activity level   Outcome: Progressing     Problem: DISCHARGE PLANNING  Goal: Discharge to home or other facility with appropriate resources  Description: INTERVENTIONS:  - Identify barriers to discharge w/patient and caregiver  - Arrange for needed discharge resources and transportation as appropriate  - Identify discharge learning needs (meds, wound care, etc )  - Arrange for interpretive services to assist at discharge as needed  - Refer to Case Management Department for coordinating discharge planning if the patient needs post-hospital services based on physician/advanced practitioner order or complex needs related to functional status, cognitive ability, or social support system  Outcome: Progressing     Problem: Knowledge Deficit  Goal: Patient/family/caregiver demonstrates understanding of disease process, treatment plan, medications, and discharge instructions  Description: Complete learning assessment and assess knowledge base    Interventions:  - Provide teaching at level of understanding  - Provide teaching via preferred learning methods  Outcome: Progressing

## 2023-06-15 NOTE — PLAN OF CARE
Problem: Potential for Falls  Goal: Patient will remain free of falls  Description: INTERVENTIONS:  - Educate patient/family on patient safety including physical limitations  - Instruct patient to call for assistance with activity   - Consult OT/PT to assist with strengthening/mobility   - Keep Call bell within reach  - Keep bed low and locked with side rails adjusted as appropriate  - Keep care items and personal belongings within reach  - Initiate and maintain comfort rounds  - Make Fall Risk Sign visible to staff  - Apply yellow socks and bracelet for high fall risk patients  - Consider moving patient to room near nurses station  Outcome: Progressing     Problem: PAIN - ADULT  Goal: Verbalizes/displays adequate comfort level or baseline comfort level  Description: Interventions:  - Encourage patient to monitor pain and request assistance  - Assess pain using appropriate pain scale  - Administer analgesics based on type and severity of pain and evaluate response  - Implement non-pharmacological measures as appropriate and evaluate response  - Consider cultural and social influences on pain and pain management  - Notify physician/advanced practitioner if interventions unsuccessful or patient reports new pain  Outcome: Progressing     Problem: INFECTION - ADULT  Goal: Absence or prevention of progression during hospitalization  Description: INTERVENTIONS:  - Assess and monitor for signs and symptoms of infection  - Monitor lab/diagnostic results  - Monitor all insertion sites, i e  indwelling lines, tubes, and drains  - Monitor endotracheal if appropriate and nasal secretions for changes in amount and color  - [place] appropriate cooling/warming therapies per order  - Administer medications as ordered  - Instruct and encourage patient and family to use good hand hygiene technique  - Identify and instruct in appropriate isolation precautions for identified infection/condition  Outcome: Progressing  Goal: Absence of fever/infection during neutropenic period  Description: INTERVENTIONS:  - Monitor WBC    Outcome: Progressing     Problem: SAFETY ADULT  Goal: Patient will remain free of falls  Description: INTERVENTIONS:  - Educate patient/family on patient safety including physical limitations  - Instruct patient to call for assistance with activity   - Consult OT/PT to assist with strengthening/mobility   - Keep Call bell within reach  - Keep bed low and locked with side rails adjusted as appropriate  - Keep care items and personal belongings within reach  - Initiate and maintain comfort rounds  - Make Fall Risk Sign visible to staff  -- Apply yellow socks and bracelet for high fall risk patients  - Consider moving patient to room near nurses station  Outcome: Progressing  Goal: Maintain or return to baseline ADL function  Description: INTERVENTIONS:  -  Assess patient's ability to carry out ADLs; assess patient's baseline for ADL function and identify physical deficits which impact ability to perform ADLs (bathing, care of mouth/teeth, toileting, grooming, dressing, etc )  - Assess/evaluate cause of self-care deficits   - Assess range of motion  - Assess patient's mobility; develop plan if impaired  - Assess patient's need for assistive devices and provide as appropriate  - Encourage maximum independence but intervene and supervise when necessary  - Involve family in performance of ADLs  - Assess for home care needs following discharge   - Consider OT consult to assist with ADL evaluation and planning for discharge  - Provide patient education as appropriate  Outcome: Progressing  Goal: Maintains/Returns to pre admission functional level  Description: INTERVENTIONS:  - Perform BMAT or MOVE assessment daily    - Set and communicate daily mobility goal to care team and patient/family/caregiver     - Collaborate with rehabilitation services on mobility goals if consulted  - Out of bed for toileting  - Record patient progress and toleration of activity level   Outcome: Progressing     Problem: DISCHARGE PLANNING  Goal: Discharge to home or other facility with appropriate resources  Description: INTERVENTIONS:  - Identify barriers to discharge w/patient and caregiver  - Arrange for needed discharge resources and transportation as appropriate  - Identify discharge learning needs (meds, wound care, etc )  - Arrange for interpretive services to assist at discharge as needed  - Refer to Case Management Department for coordinating discharge planning if the patient needs post-hospital services based on physician/advanced practitioner order or complex needs related to functional status, cognitive ability, or social support system  Outcome: Progressing     Problem: Knowledge Deficit  Goal: Patient/family/caregiver demonstrates understanding of disease process, treatment plan, medications, and discharge instructions  Description: Complete learning assessment and assess knowledge base    Interventions:  - Provide teaching at level of understanding  - Provide teaching via preferred learning methods  Outcome: Progressing

## 2023-06-16 VITALS
DIASTOLIC BLOOD PRESSURE: 82 MMHG | BODY MASS INDEX: 26.89 KG/M2 | HEART RATE: 92 BPM | SYSTOLIC BLOOD PRESSURE: 123 MMHG | OXYGEN SATURATION: 99 % | TEMPERATURE: 97.4 F | RESPIRATION RATE: 19 BRPM | HEIGHT: 59 IN | WEIGHT: 133.38 LBS

## 2023-06-16 LAB
ANION GAP SERPL CALCULATED.3IONS-SCNC: 5 MMOL/L (ref 4–13)
BASOPHILS # BLD AUTO: 0.02 THOUSANDS/ÂΜL (ref 0–0.1)
BASOPHILS NFR BLD AUTO: 0 % (ref 0–1)
BUN SERPL-MCNC: 11 MG/DL (ref 5–25)
CALCIUM SERPL-MCNC: 9 MG/DL (ref 8.4–10.2)
CHLORIDE SERPL-SCNC: 105 MMOL/L (ref 96–108)
CO2 SERPL-SCNC: 26 MMOL/L (ref 21–32)
CREAT SERPL-MCNC: 0.31 MG/DL (ref 0.6–1.3)
EOSINOPHIL # BLD AUTO: 0.02 THOUSAND/ÂΜL (ref 0–0.61)
EOSINOPHIL NFR BLD AUTO: 0 % (ref 0–6)
ERYTHROCYTE [DISTWIDTH] IN BLOOD BY AUTOMATED COUNT: 13 % (ref 11.6–15.1)
GFR SERPL CREATININE-BSD FRML MDRD: 138 ML/MIN/1.73SQ M
GLUCOSE SERPL-MCNC: 92 MG/DL (ref 65–140)
HCT VFR BLD AUTO: 40.7 % (ref 34.8–46.1)
HGB BLD-MCNC: 13.3 G/DL (ref 11.5–15.4)
IMM GRANULOCYTES # BLD AUTO: 0.03 THOUSAND/UL (ref 0–0.2)
IMM GRANULOCYTES NFR BLD AUTO: 0 % (ref 0–2)
LYMPHOCYTES # BLD AUTO: 2.83 THOUSANDS/ÂΜL (ref 0.6–4.47)
LYMPHOCYTES NFR BLD AUTO: 37 % (ref 14–44)
MAGNESIUM SERPL-MCNC: 1.8 MG/DL (ref 1.9–2.7)
MCH RBC QN AUTO: 26.5 PG (ref 26.8–34.3)
MCHC RBC AUTO-ENTMCNC: 32.7 G/DL (ref 31.4–37.4)
MCV RBC AUTO: 81 FL (ref 82–98)
MONOCYTES # BLD AUTO: 0.64 THOUSAND/ÂΜL (ref 0.17–1.22)
MONOCYTES NFR BLD AUTO: 8 % (ref 4–12)
NEUTROPHILS # BLD AUTO: 4.17 THOUSANDS/ÂΜL (ref 1.85–7.62)
NEUTS SEG NFR BLD AUTO: 55 % (ref 43–75)
NRBC BLD AUTO-RTO: 0 /100 WBCS
PLATELET # BLD AUTO: 337 THOUSANDS/UL (ref 149–390)
PMV BLD AUTO: 10.5 FL (ref 8.9–12.7)
POTASSIUM SERPL-SCNC: 4.3 MMOL/L (ref 3.5–5.3)
RBC # BLD AUTO: 5.01 MILLION/UL (ref 3.81–5.12)
SODIUM SERPL-SCNC: 136 MMOL/L (ref 135–147)
WBC # BLD AUTO: 7.71 THOUSAND/UL (ref 4.31–10.16)

## 2023-06-16 PROCEDURE — 99239 HOSP IP/OBS DSCHRG MGMT >30: CPT | Performed by: STUDENT IN AN ORGANIZED HEALTH CARE EDUCATION/TRAINING PROGRAM

## 2023-06-16 PROCEDURE — 85025 COMPLETE CBC W/AUTO DIFF WBC: CPT | Performed by: STUDENT IN AN ORGANIZED HEALTH CARE EDUCATION/TRAINING PROGRAM

## 2023-06-16 PROCEDURE — 83735 ASSAY OF MAGNESIUM: CPT | Performed by: STUDENT IN AN ORGANIZED HEALTH CARE EDUCATION/TRAINING PROGRAM

## 2023-06-16 PROCEDURE — 80048 BASIC METABOLIC PNL TOTAL CA: CPT | Performed by: STUDENT IN AN ORGANIZED HEALTH CARE EDUCATION/TRAINING PROGRAM

## 2023-06-16 RX ORDER — FAMOTIDINE 20 MG/1
20 TABLET, FILM COATED ORAL 2 TIMES DAILY
Qty: 28 TABLET | Refills: 0 | Status: SHIPPED | OUTPATIENT
Start: 2023-06-16 | End: 2023-06-30

## 2023-06-16 RX ORDER — LORATADINE 10 MG/1
10 TABLET ORAL DAILY
Qty: 30 TABLET | Refills: 0 | Status: SHIPPED | OUTPATIENT
Start: 2023-06-17 | End: 2023-07-17

## 2023-06-16 RX ORDER — HYDROXYZINE HYDROCHLORIDE 25 MG/1
25 TABLET, FILM COATED ORAL 3 TIMES DAILY
Qty: 30 TABLET | Refills: 0 | Status: SHIPPED | OUTPATIENT
Start: 2023-06-16 | End: 2023-06-26

## 2023-06-16 RX ORDER — PREDNISONE 10 MG/1
TABLET ORAL
Qty: 30 TABLET | Refills: 0 | Status: SHIPPED | OUTPATIENT
Start: 2023-06-16 | End: 2023-06-28

## 2023-06-16 RX ADMIN — METHYLPREDNISOLONE SODIUM SUCCINATE 60 MG: 125 INJECTION, POWDER, FOR SOLUTION INTRAMUSCULAR; INTRAVENOUS at 09:46

## 2023-06-16 RX ADMIN — AMLODIPINE BESYLATE 2.5 MG: 2.5 TABLET ORAL at 09:46

## 2023-06-16 RX ADMIN — HYDROXYZINE HYDROCHLORIDE 25 MG: 25 TABLET, FILM COATED ORAL at 09:46

## 2023-06-16 RX ADMIN — HYDROCHLOROTHIAZIDE 25 MG: 25 TABLET ORAL at 09:46

## 2023-06-16 RX ADMIN — PANTOPRAZOLE SODIUM 20 MG: 20 TABLET, DELAYED RELEASE ORAL at 05:22

## 2023-06-16 RX ADMIN — FAMOTIDINE 20 MG: 20 TABLET ORAL at 09:45

## 2023-06-16 RX ADMIN — METOPROLOL SUCCINATE 50 MG: 50 TABLET, EXTENDED RELEASE ORAL at 09:45

## 2023-06-16 RX ADMIN — LORATADINE 10 MG: 10 TABLET ORAL at 09:46

## 2023-06-16 NOTE — ASSESSMENT & PLAN NOTE
Patient with history of chronic urticaria    Recently seen and treated at [facility] 2 weeks prior  Currently improving, continue Pepcid, Atarax and IV steroids  Unclear of etiology, suspect chronic idiopathic urticaria  Discharge patient on prednisone taper 40 mg, decreasing 10 mg every 3 days  Recommend follow-up outpatient with allergist, referral also given for dermatologist  Continue Pepcid twice daily, Atarax 3 times daily and prednisone taper  Symptoms resolving

## 2023-06-16 NOTE — PLAN OF CARE
Problem: Potential for Falls  Goal: Patient will remain free of falls  Description: INTERVENTIONS:  - Educate patient/family on patient safety including physical limitations  - Instruct patient to call for assistance with activity   - Consult OT/PT to assist with strengthening/mobility   - Keep Call bell within reach  - Keep bed low and locked with side rails adjusted as appropriate  - Keep care items and personal belongings within reach  - Initiate and maintain comfort rounds  - Make Fall Risk Sign visible to staff  - Offer Toileting every 2 Hours, in advance of need  - Initiate/Maintain alarm  - Obtain necessary fall risk management equipment:   - Apply yellow socks and bracelet for high fall risk patients  - Consider moving patient to room near nurses station  Outcome: Progressing     Problem: PAIN - ADULT  Goal: Verbalizes/displays adequate comfort level or baseline comfort level  Description: Interventions:  - Encourage patient to monitor pain and request assistance  - Assess pain using appropriate pain scale  - Administer analgesics based on type and severity of pain and evaluate response  - Implement non-pharmacological measures as appropriate and evaluate response  - Consider cultural and social influences on pain and pain management  - Notify physician/advanced practitioner if interventions unsuccessful or patient reports new pain  Outcome: Progressing     Problem: INFECTION - ADULT  Goal: Absence or prevention of progression during hospitalization  Description: INTERVENTIONS:  - Assess and monitor for signs and symptoms of infection  - Monitor lab/diagnostic results  - Monitor all insertion sites, i e  indwelling lines, tubes, and drains  - Monitor endotracheal if appropriate and nasal secretions for changes in amount and color  - [place] appropriate cooling/warming therapies per order  - Administer medications as ordered  - Instruct and encourage patient and family to use good hand hygiene technique  - Identify and instruct in appropriate isolation precautions for identified infection/condition  Outcome: Progressing  Goal: Absence of fever/infection during neutropenic period  Description: INTERVENTIONS:  - Monitor WBC    Outcome: Progressing     Problem: SAFETY ADULT  Goal: Patient will remain free of falls  Description: INTERVENTIONS:  - Educate patient/family on patient safety including physical limitations  - Instruct patient to call for assistance with activity   - Consult OT/PT to assist with strengthening/mobility   - Keep Call bell within reach  - Keep bed low and locked with side rails adjusted as appropriate  - Keep care items and personal belongings within reach  - Initiate and maintain comfort rounds  - Make Fall Risk Sign visible to staff  - Offer Toileting every 2 Hours, in advance of need  - Initiate/Maintain alarm  - Obtain necessary fall risk management equipment:   - Apply yellow socks and bracelet for high fall risk patients  - Consider moving patient to room near nurses station  Outcome: Progressing  Goal: Maintain or return to baseline ADL function  Description: INTERVENTIONS:  -  Assess patient's ability to carry out ADLs; assess patient's baseline for ADL function and identify physical deficits which impact ability to perform ADLs (bathing, care of mouth/teeth, toileting, grooming, dressing, etc )  - Assess/evaluate cause of self-care deficits   - Assess range of motion  - Assess patient's mobility; develop plan if impaired  - Assess patient's need for assistive devices and provide as appropriate  - Encourage maximum independence but intervene and supervise when necessary  - Involve family in performance of ADLs  - Assess for home care needs following discharge   - Consider OT consult to assist with ADL evaluation and planning for discharge  - Provide patient education as appropriate  Outcome: Progressing  Goal: Maintains/Returns to pre admission functional level  Description: INTERVENTIONS:  - Perform BMAT or MOVE assessment daily    - Set and communicate daily mobility goal to care team and patient/family/caregiver  - Collaborate with rehabilitation services on mobility goals if consulted  - Perform Range of Motion 3 times a day  - Reposition patient every 2 hours  - Dangle patient 3 times a day  - Stand patient 3 times a day  - Ambulate patient 3 times a day  - Out of bed to chair 3 times a day   - Out of bed for meals 3 times a day  - Out of bed for toileting  - Record patient progress and toleration of activity level   Outcome: Progressing     Problem: DISCHARGE PLANNING  Goal: Discharge to home or other facility with appropriate resources  Description: INTERVENTIONS:  - Identify barriers to discharge w/patient and caregiver  - Arrange for needed discharge resources and transportation as appropriate  - Identify discharge learning needs (meds, wound care, etc )  - Arrange for interpretive services to assist at discharge as needed  - Refer to Case Management Department for coordinating discharge planning if the patient needs post-hospital services based on physician/advanced practitioner order or complex needs related to functional status, cognitive ability, or social support system  Outcome: Progressing     Problem: Knowledge Deficit  Goal: Patient/family/caregiver demonstrates understanding of disease process, treatment plan, medications, and discharge instructions  Description: Complete learning assessment and assess knowledge base    Interventions:  - Provide teaching at level of understanding  - Provide teaching via preferred learning methods  Outcome: Progressing

## 2023-06-16 NOTE — NURSING NOTE
Discharge instructions reviewed and questions answered with assistance of Turkmen speaking staff  Discharged to home

## 2023-06-16 NOTE — DISCHARGE SUMMARY
[facility]  Discharge- Berna Villanueva 1980, 39 y o  female MRN: [id number]  Unit/Bed#: E5 -01 Encounter: [id number]  Primary Care Provider: Charmaine Chavez MD   Date and time admitted to hospital: 6/14/2023  6:58 PM    Hypokalemia  Assessment & Plan  Repleted    Allergic reaction  Assessment & Plan  Reports eating crab salad, symptoms initially were lip swelling  Did use epinephrine pen  Recommend avoidance of all shellfish    HTN (hypertension)  Assessment & Plan  Home medication metoprolol, hydrochlorothiazide and amlodipine    * Chronic urticaria  Assessment & Plan  Patient with history of chronic urticaria  Recently seen and treated at [facility] 2 weeks prior  Currently improving, continue Pepcid, Atarax and IV steroids  Unclear of etiology, suspect chronic idiopathic urticaria  Discharge patient on prednisone taper 40 mg, decreasing 10 mg every 3 days  Recommend follow-up outpatient with allergist, referral also given for dermatologist  Continue Pepcid twice daily, Atarax 3 times daily and prednisone taper  Symptoms resolving        Discharging Physician / Practitioner: Ole Smith MD  PCP: Charmaine Chavez MD  Admission Date:   Admission Orders (From admission, onward)     Ordered        06/14/23 2146  Place in Observation  Once                      Discharge Date: 06/16/23    Medical Problems     Resolved Problems  Date Reviewed: 6/16/2023   None         Consultations During Hospital Stay:  · none    Procedures Performed:   · none    Significant Findings / Test Results:   · No results found    ·     Incidental Findings:   · none     Test Results Pending at Discharge (will require follow up):   · none     Outpatient Tests Requested:  · none    Complications:  none    Reason for Admission: Urticaria    Hospital Course:     Ivet Serrato is a 39 y o  female patient who originally presented to the hospital on 6/14/2023 due to itching in her lives with "new developing rash  Patient reportedly had shellfish with crab salad, with history of shrimp allergy  She developed worsening swelling of her lips, diffuse rash and presented to the ED  She had no significant stridor or reports of throat swelling and or angioedema  Treated with IV steroids, Pepcid and Atarax  Symptoms resolved over the course of the day  Patient discharged home with prednisone taper, referral given for dermatology and for patient to follow-up with allergist   Follow-up with PCP outpatient  Please see above list of diagnoses and related plan for additional information  Condition at Discharge: fair     Discharge Day Visit / Exam:     Subjective:    Patient's rash, erythema has significantly improved  She still continues to have some pruritus though denies any fevers, chills nausea or vomiting  Tolerating diet, breathing well without difficulty  Vitals: Blood Pressure: 123/82 (06/16/23 0749)  Pulse: 92 (06/16/23 0749)  Temperature: (!) 97 4 °F (36 3 °C) (06/16/23 0749)  Temp Source: Oral (06/16/23 0749)  Respirations: 19 (06/16/23 0749)  Height: 4' 11\" (149 9 cm) (06/14/23 1902)  Weight - Scale: 60 5 kg (133 lb 6 1 oz) (06/16/23 0550)  SpO2: 99 % (06/16/23 0749)  Exam:   Physical Exam  Vitals and nursing note reviewed  Constitutional:       Appearance: Normal appearance  HENT:      Head: Normocephalic and atraumatic  Eyes:      General: No scleral icterus  Conjunctiva/sclera: Conjunctivae normal    Cardiovascular:      Rate and Rhythm: Normal rate and regular rhythm  Pulmonary:      Breath sounds: No wheezing or rhonchi  Abdominal:      General: Bowel sounds are normal  There is no distension  Palpations: Abdomen is soft  Musculoskeletal:         General: No swelling  Right lower leg: No edema  Skin:     General: Skin is warm and dry  Neurological:      General: No focal deficit present  Mental Status: She is alert  Mental status is at baseline   " Discharge instructions/Information to patient and family:   See after visit summary for information provided to patient and family  Provisions for Follow-Up Care:  See after visit summary for information related to follow-up care and any pertinent home health orders  Disposition:     Home     Discharge Statement:  I spent 40 minutes discharging the patient  This time was spent on the day of discharge  I had direct contact with the patient on the day of discharge  Greater than 50% of the total time was spent examining patient, answering all patient questions, arranging and discussing plan of care with patient as well as directly providing post-discharge instructions  Additional time then spent on discharge activities  Discharge Medications:  See after visit summary for reconciled discharge medications provided to patient and family        ** Please Note: This note has been constructed using a voice recognition system **

## 2024-04-05 ENCOUNTER — HOSPITAL ENCOUNTER (OUTPATIENT)
Dept: RADIOLOGY | Facility: HOSPITAL | Age: 44
End: 2024-04-05
Payer: COMMERCIAL

## 2024-04-05 DIAGNOSIS — S80.02XA CONTUSION OF LEFT KNEE, INITIAL ENCOUNTER: ICD-10-CM

## 2024-04-05 DIAGNOSIS — S13.4XXA WHIPLASH INJURY TO NECK, INITIAL ENCOUNTER: ICD-10-CM

## 2024-04-05 DIAGNOSIS — S33.5XXA LUMBAR SPRAIN, INITIAL ENCOUNTER: ICD-10-CM

## 2024-04-05 PROCEDURE — 73560 X-RAY EXAM OF KNEE 1 OR 2: CPT

## 2024-04-05 PROCEDURE — 72100 X-RAY EXAM L-S SPINE 2/3 VWS: CPT

## 2024-04-05 PROCEDURE — 72040 X-RAY EXAM NECK SPINE 2-3 VW: CPT

## 2024-04-25 ENCOUNTER — HOSPITAL ENCOUNTER (OUTPATIENT)
Dept: MRI IMAGING | Facility: HOSPITAL | Age: 44
End: 2024-04-25
Payer: COMMERCIAL

## 2024-04-25 DIAGNOSIS — S33.5XXA SPRAIN OF LIGAMENTS OF LUMBAR SPINE, INITIAL ENCOUNTER: ICD-10-CM

## 2024-04-25 PROCEDURE — 72148 MRI LUMBAR SPINE W/O DYE: CPT

## 2024-09-06 ENCOUNTER — APPOINTMENT (EMERGENCY)
Dept: CT IMAGING | Facility: HOSPITAL | Age: 44
End: 2024-09-06

## 2024-09-06 ENCOUNTER — HOSPITAL ENCOUNTER (EMERGENCY)
Facility: HOSPITAL | Age: 44
Discharge: HOME | End: 2024-09-07
Attending: EMERGENCY MEDICINE

## 2024-09-06 VITALS
RESPIRATION RATE: 18 BRPM | WEIGHT: 126.1 LBS | BODY MASS INDEX: 25.47 KG/M2 | DIASTOLIC BLOOD PRESSURE: 99 MMHG | TEMPERATURE: 98.2 F | SYSTOLIC BLOOD PRESSURE: 164 MMHG | HEART RATE: 85 BPM | OXYGEN SATURATION: 99 %

## 2024-09-06 DIAGNOSIS — S01.81XA LACERATION OF FOREHEAD, INITIAL ENCOUNTER: Primary | ICD-10-CM

## 2024-09-06 PROCEDURE — 90715 TDAP VACCINE 7 YRS/> IM: CPT

## 2024-09-06 PROCEDURE — 90471 IMMUNIZATION ADMIN: CPT

## 2024-09-06 PROCEDURE — 70486 CT MAXILLOFACIAL W/O DYE: CPT

## 2024-09-06 PROCEDURE — 99283 EMERGENCY DEPT VISIT LOW MDM: CPT

## 2024-09-06 RX ORDER — LIDOCAINE HYDROCHLORIDE 10 MG/ML
10 INJECTION, SOLUTION EPIDURAL; INFILTRATION; INTRACAUDAL; PERINEURAL ONCE
Status: COMPLETED | OUTPATIENT
Start: 2024-09-06 | End: 2024-09-06

## 2024-09-06 RX ORDER — ACETAMINOPHEN 325 MG/1
650 TABLET ORAL ONCE
Status: COMPLETED | OUTPATIENT
Start: 2024-09-06 | End: 2024-09-06

## 2024-09-06 RX ADMIN — LIDOCAINE HYDROCHLORIDE 10 ML: 10 INJECTION, SOLUTION EPIDURAL; INFILTRATION; INTRACAUDAL at 23:28

## 2024-09-06 RX ADMIN — TETANUS TOXOID, REDUCED DIPHTHERIA TOXOID AND ACELLULAR PERTUSSIS VACCINE, ADSORBED 0.5 ML: 5; 2.5; 8; 8; 2.5 SUSPENSION INTRAMUSCULAR at 23:29

## 2024-09-06 RX ADMIN — Medication 1 APPLICATION: at 23:27

## 2024-09-06 RX ADMIN — ACETAMINOPHEN 650 MG: 325 TABLET ORAL at 23:27

## 2024-09-07 PROCEDURE — 12013 RPR F/E/E/N/L/M 2.6-5.0 CM: CPT

## 2024-09-07 PROCEDURE — 99284 EMERGENCY DEPT VISIT MOD MDM: CPT

## 2024-09-07 NOTE — ED PROVIDER NOTES
History  Chief Complaint   Patient presents with    Head Injury     Pt states she was opening closet door when she hit the door against face. Pt has approx 2 inch laceration above right eye. Bleeding controlled. - thinners, - LOC     The patient is a 44-year-old female with history of hypertension, not on anticoagulation who presents to the ED for evaluation following a head injury.  She reports she was opening a closet door when she struck her forehead against the corner of the door, sustaining a laceration.  This occurred at approximately 7 PM.  The wound continued to bleed, prompting ED evaluation.  She reports she did not lose consciousness after striking her head.  She has not experienced any nausea, vomiting, vision changes, numbness, paresthesia, neck pain, focal weakness, or other symptoms since striking her head.  She is unsure if tetanus is up-to-date.  She denies significant headache, but reports significant pain to her forehead where the laceration is. HPI was obtained using  services.          Prior to Admission Medications   Prescriptions Last Dose Informant Patient Reported? Taking?   EPINEPHrine (EPIPEN) 0.3 mg/0.3 mL SOAJ   No No   Sig: Inject 0.3 mL (0.3 mg total) into a muscle once for 1 dose   albuterol (ProAir HFA) 90 mcg/act inhaler   No No   Sig: Inhale 2 puffs every 4 (four) hours as needed for shortness of breath   amLODIPine (NORVASC) 10 mg tablet   Yes No   amLODIPine (NORVASC) 2.5 mg tablet   Yes No   Sig: Take 2.5 mg by mouth daily   famotidine (PEPCID) 20 mg tablet   No No   Sig: Take 1 tablet (20 mg total) by mouth 2 (two) times a day for 14 days   hydrOXYzine HCL (ATARAX) 25 mg tablet   No No   Sig: Take 1 tablet (25 mg total) by mouth 3 (three) times a day for 10 days   hydrochlorothiazide (HYDRODIURIL) 25 mg tablet   Yes No   Sig: Take 25 mg by mouth daily   ibuprofen (MOTRIN) 600 mg tablet   Yes No   Sig: Take 600 mg by mouth every 6 (six) hours as needed   loratadine  (CLARITIN) 10 mg tablet   No No   Sig: Take 1 tablet (10 mg total) by mouth daily Do not start before June 17, 2023.   metoprolol succinate (TOPROL-XL) 50 mg 24 hr tablet   Yes No   Sig: Take 50 mg by mouth daily   omeprazole (PriLOSEC) 20 mg delayed release capsule   Yes No   Sig: Take 20 mg by mouth daily      Facility-Administered Medications: None       Past Medical History:   Diagnosis Date    Allergic     Anemia     HL (hearing loss)     Hypertension        History reviewed. No pertinent surgical history.    Family History   Problem Relation Age of Onset    No Known Problems Mother      I have reviewed and agree with the history as documented.    E-Cigarette/Vaping     E-Cigarette/Vaping Substances     Social History     Tobacco Use    Smoking status: Never    Smokeless tobacco: Never   Substance Use Topics    Alcohol use: Never    Drug use: Never       Review of Systems   Constitutional:  Negative for chills and fever.   HENT:  Negative for congestion and rhinorrhea.    Eyes:  Negative for pain, redness and visual disturbance.   Respiratory:  Negative for cough and shortness of breath.    Cardiovascular:  Negative for chest pain and leg swelling.   Gastrointestinal:  Negative for abdominal pain, constipation, diarrhea, nausea and vomiting.   Musculoskeletal:  Negative for arthralgias, gait problem, myalgias and neck pain.   Skin:  Positive for wound. Negative for rash.   Neurological:  Positive for headaches. Negative for dizziness, weakness and numbness.       Physical Exam  Physical Exam  Vitals and nursing note reviewed.   Constitutional:       General: She is not in acute distress.     Appearance: She is well-developed. She is not toxic-appearing.   HENT:      Head: Normocephalic and atraumatic.   Eyes:      Extraocular Movements: Extraocular movements intact.      Conjunctiva/sclera: Conjunctivae normal.      Pupils: Pupils are equal, round, and reactive to light.   Cardiovascular:      Rate and Rhythm:  Normal rate.   Pulmonary:      Effort: Pulmonary effort is normal. No respiratory distress.   Abdominal:      General: Abdomen is flat.   Musculoskeletal:         General: No swelling. Normal range of motion.      Cervical back: Neck supple. No rigidity or tenderness.   Skin:     General: Skin is warm and dry.      Capillary Refill: Capillary refill takes less than 2 seconds.      Comments: 4.5 cm linear laceration through right eyebrow as below. The superior aspect of the laceration is superficial and not gaping, whereas the inferior aspect is gaping as pictured below   Neurological:      Mental Status: She is alert and oriented to person, place, and time.      GCS: GCS eye subscore is 4. GCS verbal subscore is 5. GCS motor subscore is 6.      Cranial Nerves: Cranial nerves 2-12 are intact. No facial asymmetry.      Sensory: Sensation is intact.      Motor: Motor function is intact. No pronator drift.      Coordination: Coordination is intact.   Psychiatric:         Mood and Affect: Mood normal.         Vital Signs  ED Triage Vitals [09/06/24 2152]   Temperature Pulse Respirations Blood Pressure SpO2   98.2 °F (36.8 °C) 85 18 164/99 99 %      Temp Source Heart Rate Source Patient Position - Orthostatic VS BP Location FiO2 (%)   Oral Monitor Sitting Left arm --      Pain Score       8           Vitals:    09/06/24 2152   BP: 164/99   Pulse: 85   Patient Position - Orthostatic VS: Sitting         Visual Acuity      ED Medications  Medications   acetaminophen (TYLENOL) tablet 650 mg (650 mg Oral Given 9/6/24 2327)   tetanus-diphtheria-acellular pertussis (BOOSTRIX) IM injection 0.5 mL (0.5 mL Intramuscular Given 9/6/24 2329)   LET gel 1 Application (1 Application Topical Given 9/6/24 2327)   lidocaine (PF) (XYLOCAINE-MPF) 1 % injection 10 mL (10 mL Infiltration Given 9/6/24 2328)       Diagnostic Studies  Results Reviewed       None                   CT facial bones without contrast   Final Result by Ashely MENDOZA  MD Winnie (09/07 0110)      No evidence of acute traumatic injury to the facial bones.      Air-fluid level with frothy secretions in the left sphenoid sinus may represent acute sinusitis in the appropriate setting.               Workstation performed: [id number]                    Procedures  Universal Protocol:  procedure performed by consultantConsent: Verbal consent obtained.  Risks and benefits: risks, benefits and alternatives were discussed  Consent given by: patient  Patient understanding: patient states understanding of the procedure being performed  Patient consent: the patient's understanding of the procedure matches consent given  Patient identity confirmed: verbally with patient and arm band  Laceration repair    Date/Time: 9/7/2024 1:15 AM    Performed by: Deloris Conn PA-C  Authorized by: Deloris Conn PA-C  Body area: head/neck  Location details: right eyebrow  Laceration length: 4.5 cm  Anesthesia: local infiltration    Anesthesia:  Local Anesthetic: lidocaine 1% without epinephrine and LET (lido, epi, tetracaine)  Anesthetic total: 4 mL    Sedation:  Patient sedated: no      Wound Dehiscence:  Superficial Wound Dehiscence: simple closure      Procedure Details:  Preparation: Patient was prepped and draped in the usual sterile fashion.  Irrigation solution: saline  Irrigation method: syringe  Amount of cleaning: standard  Skin closure: 6-0 Prolene, glue and Steri-Strips  Number of sutures: 5  Technique: simple  Approximation: close  Approximation difficulty: simple  Patient tolerance: patient tolerated the procedure well with no immediate complications  Comments: Glue and steri strips to anterior aspect, sutures to inferior               ED Course           SBIRT 20yo+      Flowsheet Row Most Recent Value   Initial Alcohol Screen: US AUDIT-C     1. How often do you have a drink containing alcohol? 0 Filed at: 09/06/2024 0472   2. How many drinks containing alcohol do you  have on a typical day you are drinking?  0 Filed at: 09/06/2024 2150   3a. Male UNDER 65: How often do you have five or more drinks on one occasion? 0 Filed at: 09/06/2024 2150   3b. FEMALE Any Age, or MALE 65+: How often do you have 4 or more drinks on one occassion? 0 Filed at: 09/06/2024 2150   Audit-C Score 0 Filed at: 09/06/2024 2150   DOMONIQUE: How many times in the past year have you...    Used an illegal drug or used a prescription medication for non-medical reasons? Never Filed at: 09/06/2024 2150          Medical Decision Making  Will obtain CT facial bones to evaluate for underlying fracture.  Laceration repair performed as above.  Consent obtained for laceration repair using .   used throughout laceration repair.  Instructed patient regarding suture care, and where and when to present for suture removal.  Provided patient with information for plastic surgery given this is a facial laceration.  Patient verbalized understanding agreement regarding these instructions.    Tdap given    CT findings as above.  Suspect CT finding secondary to patient crying, as she was upset about requiring sutures.  Patient denies having had any URI symptoms/cold symptoms recently.  Advised her to follow-up with PCP regarding this finding    At the time of discharge, the patient is in no acute distress. I discussed with the patient the diagnosis, treatment plan, follow-up, return precautions, and discharge instructions; they were given the opportunity to ask questions and verbalized understanding.    Problems Addressed:  Laceration of forehead, initial encounter: acute illness or injury    Amount and/or Complexity of Data Reviewed  External Data Reviewed: labs and notes.  Radiology: ordered. Decision-making details documented in ED Course.    Risk  OTC drugs.  Prescription drug management.                 Disposition  Final diagnoses:   Laceration of forehead, initial encounter     Time reflects when diagnosis  was documented in both MDM as applicable and the Disposition within this note       Time User Action Codes Description Comment    9/7/2024  1:15 AM SeniaDeloris Add [S01.81XA] Laceration of forehead, initial encounter           ED Disposition       ED Disposition   Discharge    Condition   Stable    Date/Time   Sat Sep 7, 2024 0115    Comment   Isabelle Villanueva discharge to home/self care.                   Follow-up Information       Follow up With Specialties Details Why Contact Info Additional Information    [facility]   [address]  [address]  [place] [address]  [phone] [facility] [place] [address] [place]  [address]  [phone]            Discharge Medication List as of 9/7/2024  1:19 AM        CONTINUE these medications which have NOT CHANGED    Details   albuterol (ProAir HFA) 90 mcg/act inhaler Inhale 2 puffs every 4 (four) hours as needed for shortness of breath, Starting Sat 4/23/2022, Print      !! amLODIPine (NORVASC) 10 mg tablet Starting Tue 2/18/2020, Historical Med      !! amLODIPine (NORVASC) 2.5 mg tablet Take 2.5 mg by mouth daily, Historical Med      EPINEPHrine (EPIPEN) 0.3 mg/0.3 mL SOAJ Inject 0.3 mL (0.3 mg total) into a muscle once for 1 dose, Starting Tue 6/13/2023, Print      famotidine (PEPCID) 20 mg tablet Take 1 tablet (20 mg total) by mouth 2 (two) times a day for 14 days, Starting Fri 6/16/2023, Until Fri 6/30/2023, Normal      hydrochlorothiazide (HYDRODIURIL) 25 mg tablet Take 25 mg by mouth daily, Starting Fri 8/30/2019, Until Wed 6/14/2023, Historical Med      hydrOXYzine HCL (ATARAX) 25 mg tablet Take 1 tablet (25 mg total) by mouth 3 (three) times a day for 10 days, Starting Fri 6/16/2023, Until Mon 6/26/2023, Normal      ibuprofen (MOTRIN) 600 mg tablet Take 600 mg by mouth every 6 (six) hours as needed, Starting Mon  8/8/2022, Until Tue 8/8/2023 at 2359, Historical Med      loratadine (CLARITIN) 10 mg tablet Take 1 tablet (10 mg total) by mouth daily Do not start before June 17, 2023., Starting Sat 6/17/2023, Until Mon 7/17/2023, Normal      metoprolol succinate (TOPROL-XL) 50 mg 24 hr tablet Take 50 mg by mouth daily, Historical Med      omeprazole (PriLOSEC) 20 mg delayed release capsule Take 20 mg by mouth daily, Starting Mon 2/14/2022, Until Wed 6/14/2023, Historical Med       !! - Potential duplicate medications found. Please discuss with provider.              PDMP Review       None            ED Provider  Electronically Signed by             Deloris Conn PA-C  09/07/24 0588

## 2024-09-07 NOTE — DISCHARGE INSTRUCTIONS
"[facility]rese a [facility] / PCP / Atención de urgencia / ED para la extracción de suturas en 5 días    Deje que el agua y el jabón corran sobre él suavemente, no frote    Evite los productos a base de aceite en pegamento     Para la mejora de la cicatriz:  -Evitar el sol en la medida de lo posible sandro 1 año. Use sombrero, aplique protector solar diariamente a la cicatriz después de quitar los puntos  -Aplicar aceite de vitamina E después de 6 meses  -Masajes de cicatrices después de 4 semanas  -Tiras de silicona para cicatrices después de 4 semanas    Regrese a la [place] de emergencias si presenta signos de infección: secreción purulenta (pus), fiebre, escalofríos, aumento del enrojecimiento, aumento del dolor, cambios en la visión o cualquier otro síntoma nuevo o preocupante    [place] un seguimiento con galo médico de atención primaria para detectar cualquier síntoma de resfriado debido a la tomografía computarizada leer \"   El nivel del líquido aéreo con secreciones espumosas en el seno esfenoidal jose puede representar sinusitis aguda en el entorno adecuado\".    =====================================  Return to Plastic Surgery/PCP/Urgent Care/ED for suture removal in 5 days    Let soap and water run over it gently- do not scrub    Avoid oil based products on glue     For scar improvement:  -Avoid sun as much as possible for 1 year. Wear hat, apply sunscreen daily to scar after stitches are removed  -Apply Vitamin E oil after 6 months  -Scar massages after 4 weeks  -Silicone scar strips after 4 weeks    Return to ED for signs of infection: purulent drainage (pus), fever, chills, increased redness, increased pain, vision changes, or any other new/concerning symptoms    Follow up with your PCP for any cold symptoms due to CT read \"   Air-fluid level with frothy secretions in the left sphenoid sinus may represent acute sinusitis in the appropriate setting.\"  "

## 2024-09-11 ENCOUNTER — TELEPHONE (OUTPATIENT)
Dept: PLASTIC SURGERY | Facility: CLINIC | Age: 44
End: 2024-09-11

## 2024-09-11 NOTE — TELEPHONE ENCOUNTER
Lvm to schedule consult with Pa for forehead lac. Pt can be scheduled with any pa at anytime for 30 min. Thank you!

## 2024-09-13 ENCOUNTER — HOSPITAL ENCOUNTER (OUTPATIENT)
Dept: MRI IMAGING | Facility: HOSPITAL | Age: 44
Discharge: HOME | End: 2024-09-13
Payer: COMMERCIAL

## 2024-09-13 DIAGNOSIS — M25.562 LEFT KNEE PAIN, UNSPECIFIED CHRONICITY: ICD-10-CM

## 2024-09-13 PROCEDURE — 73721 MRI JNT OF LWR EXTRE W/O DYE: CPT
